# Patient Record
Sex: MALE | Race: WHITE | NOT HISPANIC OR LATINO | Employment: OTHER | URBAN - NONMETROPOLITAN AREA
[De-identification: names, ages, dates, MRNs, and addresses within clinical notes are randomized per-mention and may not be internally consistent; named-entity substitution may affect disease eponyms.]

---

## 2019-09-09 RX ORDER — ALPRAZOLAM 0.5 MG/1
TABLET ORAL
Qty: 30 TABLET | Refills: 0 | OUTPATIENT
Start: 2019-09-09

## 2020-02-04 ENCOUNTER — OFFICE VISIT (OUTPATIENT)
Dept: UROLOGY | Facility: CLINIC | Age: 60
End: 2020-02-04

## 2020-02-04 VITALS
RESPIRATION RATE: 16 BRPM | DIASTOLIC BLOOD PRESSURE: 77 MMHG | SYSTOLIC BLOOD PRESSURE: 126 MMHG | OXYGEN SATURATION: 96 % | TEMPERATURE: 97.5 F | HEART RATE: 75 BPM

## 2020-02-04 DIAGNOSIS — Z80.42 FAMILY HISTORY OF PROSTATE CANCER IN FATHER: ICD-10-CM

## 2020-02-04 DIAGNOSIS — R97.20 ELEVATED PROSTATE SPECIFIC ANTIGEN (PSA): Primary | ICD-10-CM

## 2020-02-04 DIAGNOSIS — E29.1 HYPOGONADISM MALE: ICD-10-CM

## 2020-02-04 LAB
BILIRUB BLD-MCNC: NEGATIVE MG/DL
CLARITY, POC: CLEAR
COLOR UR: YELLOW
GLUCOSE UR STRIP-MCNC: NEGATIVE MG/DL
KETONES UR QL: NEGATIVE
LEUKOCYTE EST, POC: NEGATIVE
NITRITE UR-MCNC: NEGATIVE MG/ML
PH UR: 6.5 [PH] (ref 5–8)
PROT UR STRIP-MCNC: NEGATIVE MG/DL
RBC # UR STRIP: NEGATIVE /UL
SP GR UR: 1.01 (ref 1–1.03)
UROBILINOGEN UR QL: NORMAL

## 2020-02-04 PROCEDURE — 99204 OFFICE O/P NEW MOD 45 MIN: CPT | Performed by: UROLOGY

## 2020-02-04 PROCEDURE — 81003 URINALYSIS AUTO W/O SCOPE: CPT | Performed by: UROLOGY

## 2020-02-04 NOTE — PROGRESS NOTES
Chief Complaint  Elevated PSA (4.10)        CLAIRE Tijerina is a 59 y.o. male who referred for evaluation of his prostate in light of a recently discovered elevated PSA at 4.1.  Actually normal for his age is up to 4.5.  This may be more significant however and that his father has prostate cancer.  Of more importance is the fact that he is currently receiving testosterone on a regular basis for his hypogonadism and of course if he did have prostate cancer this would stimulate it to grow faster.  On the other hand the testosterone could be stimulating the prostate and be accountable for the elevated PSA rather than cancer.    He is actually an old patient of mine although I have not seen him for 20 years.  He had an episode of urinary retention after elective hemorrhoid surgery but returns today voiding without difficulty and describing an AUA index of only 6.    Vitals:    02/04/20 1019   BP: 126/77   Pulse: 75   Resp: 16   Temp: 97.5 °F (36.4 °C)   SpO2: 96%       Past Medical History  Past Medical History:   Diagnosis Date   • Depression    • Kidney infection        Past Surgical History  Past Surgical History:   Procedure Laterality Date   • KNEE SURGERY         Medications  has a current medication list which includes the following prescription(s): escitalopram oxalate.      Allergies  Allergies   Allergen Reactions   • Penicillins Hives   • Sulfa Antibiotics Hives       Social History  Social History     Socioeconomic History   • Marital status:      Spouse name: Not on file   • Number of children: Not on file   • Years of education: Not on file   • Highest education level: Not on file   Tobacco Use   • Smoking status: Former Smoker   • Smokeless tobacco: Never Used   • Tobacco comment: stopped smoking 20+ years ago   Substance and Sexual Activity   • Alcohol use: Yes   • Drug use: Never   • Sexual activity: Defer       Family History  He has no family history of bladder or kidney cancer  He has no family  history of kidney stones      AUA Symptom Score:      Review of Systems  Review of Systems   Constitutional: Negative for activity change, appetite change, chills, fatigue, fever, unexpected weight gain and unexpected weight loss.   Respiratory: Negative for apnea, cough, chest tightness, shortness of breath, wheezing and stridor.    Cardiovascular: Negative for chest pain, palpitations and leg swelling.   Gastrointestinal: Negative for abdominal distention, abdominal pain, anal bleeding, blood in stool, constipation, diarrhea, nausea, rectal pain, vomiting, GERD and indigestion.   Genitourinary: Negative for decreased libido, decreased urine volume, difficulty urinating, discharge, dysuria, flank pain, frequency, genital sores, hematuria, nocturia, penile pain, erectile dysfunction, penile swelling, scrotal swelling, testicular pain, urgency and urinary incontinence.   Musculoskeletal: Negative for back pain and joint swelling.   Neurological: Negative for tremors, seizures, speech difficulty, weakness and numbness.   Psychiatric/Behavioral: Negative for agitation, decreased concentration, sleep disturbance, depressed mood and stress. The patient is not nervous/anxious.      He does describe a discharge from his eyes along with problems with anxiety and depression.  Physical Exam  Physical Exam   Constitutional: He is oriented to person, place, and time. He appears well-developed and well-nourished.   HENT:   Head: Normocephalic and atraumatic.   Neck: Normal range of motion.   Pulmonary/Chest: Effort normal. No respiratory distress.   Abdominal: Soft. He exhibits no distension and no mass. There is no tenderness. No hernia.   Genitourinary: Rectum normal and penis normal. Prostate is enlarged.   Musculoskeletal: Normal range of motion.   Lymphadenopathy:     He has no cervical adenopathy.   Neurological: He is alert and oriented to person, place, and time.   Skin: Skin is warm and dry.   Psychiatric: He has a  normal mood and affect. His behavior is normal.   Vitals reviewed.      Labs Recent and today in the office:  Results for orders placed or performed in visit on 02/04/20   POC Urinalysis Dipstick, Automated   Result Value Ref Range    Color Yellow Yellow, Straw, Dark Yellow, Candace    Clarity, UA Clear Clear    Specific Gravity  1.015 1.005 - 1.030    pH, Urine 6.5 5.0 - 8.0    Leukocytes Negative Negative    Nitrite, UA Negative Negative    Protein, POC Negative Negative mg/dL    Glucose, UA Negative Negative, 1000 mg/dL (3+) mg/dL    Ketones, UA Negative Negative    Urobilinogen, UA Normal Normal    Bilirubin Negative Negative    Blood, UA Negative Negative         Assessment & Plan  Elevated PSA/family history of prostate cancer: The patient's risk of prostate cancer at this point is fairly low but he is informed very close surveillance will be required and if his PSA continues to progress he will need a prostate biopsy to prove he does not have cancer before safely continuing his testosterone supplement.    With his family history of prostate cancer and now elevated PSA he is strongly encouraged to eat a plant-based heart healthy diet to reduce risk of getting prostate cancer.    Hypogonadism: He has achieved a significant benefit from the testosterone supplement in terms of increase strength stamina attitude and libido and is anxious to continue the testosterone if at all possible.

## 2020-02-13 ENCOUNTER — OFFICE VISIT (OUTPATIENT)
Dept: PSYCHIATRY | Facility: CLINIC | Age: 60
End: 2020-02-13

## 2020-02-13 VITALS
HEART RATE: 92 BPM | BODY MASS INDEX: 31.68 KG/M2 | SYSTOLIC BLOOD PRESSURE: 142 MMHG | DIASTOLIC BLOOD PRESSURE: 80 MMHG | HEIGHT: 73 IN | WEIGHT: 239 LBS

## 2020-02-13 DIAGNOSIS — F41.0 PANIC DISORDER: ICD-10-CM

## 2020-02-13 DIAGNOSIS — F33.1 MODERATE EPISODE OF RECURRENT MAJOR DEPRESSIVE DISORDER (HCC): Primary | ICD-10-CM

## 2020-02-13 DIAGNOSIS — F41.9 ANXIETY DISORDER, UNSPECIFIED TYPE: ICD-10-CM

## 2020-02-13 PROCEDURE — 90792 PSYCH DIAG EVAL W/MED SRVCS: CPT | Performed by: NURSE PRACTITIONER

## 2020-02-13 RX ORDER — ESCITALOPRAM OXALATE 20 MG/1
20 TABLET ORAL DAILY
Qty: 30 TABLET | Refills: 0 | Status: SHIPPED | OUTPATIENT
Start: 2020-02-13 | End: 2020-02-13

## 2020-02-13 RX ORDER — ALPRAZOLAM 0.5 MG/1
0.5 TABLET ORAL NIGHTLY PRN
Qty: 30 TABLET | Refills: 0 | Status: SHIPPED | OUTPATIENT
Start: 2020-02-13 | End: 2020-08-11 | Stop reason: SDUPTHER

## 2020-02-13 NOTE — PROGRESS NOTES
Brady Tijerina is a 59 y.o. male who is here today for initial appointment.     Chief Complaint:     ICD-10-CM ICD-9-CM   1. Moderate episode of recurrent major depressive disorder (CMS/Formerly [facility]) F33.1 296.32   2. Anxiety disorder, unspecified type F41.9 300.00   3. Panic disorder F41.0 300.01       HPI: Pt presents for initial visit for medication management of depressive and anxiety symptoms. Pt is transferring care for [facility] Pt is currently taking Lexapro 5 mg daily. Pt also takes Xanax 0.5 mg prn for panic episodes. Continues to report depressive symptoms. States he has been unable in the past to increase the dose to 10 mg due to the reappearance of sexual side effects and weight gain with higher dosing. Pt reports the following psychiatric medication history: Bupropion XL, Paroxetine, Venlafaxine XR, Sertraline, Buspirone,Mirtazepine, Ativan and Quetiapine.    Pt reports the presence/absence of the following depressive symptoms: (+) depressed mood, (-) reduced appetite, (-) increased appetite, (-) poor concentration, (-) hopelessness, (-) worthlessness, (-) insomnia, (-) hypersomnia, (-) psychomotor agitation, (-) irritability, (-) anhedonia, (+) amotivation, (-) fatigue, (-) suicidal ideation, (-) suicidal plan, (-) suicidal intent, (-) recurrent thoughts about death, and (-) homicidal ideation.    Pt reports the presence/absence of the following anxiety symptoms: (-) excessive worry, (-) excessive fear, (+) difficulty relaxing, (-) restlessness, (-) insomnia, (-) easily fatigued, (-) irritability, (-) poor concentration, (+) racing thoughts, and (+) panic episodes.     Past Medical History:   Past Medical History:   Diagnosis Date   • Anxiety    • Depression    • Kidney infection        Past Surgical History:   Past Surgical History:   Procedure Laterality Date   • KNEE SURGERY         Social History:   Social History     Socioeconomic History   • Marital status:      Spouse name: Not  "on file   • Number of children: 3   • Years of education: Not on file   • Highest education level: High school graduate   Tobacco Use   • Smoking status: Former Smoker   • Smokeless tobacco: Never Used   • Tobacco comment: stopped smoking 20+ years ago   Substance and Sexual Activity   • Alcohol use: Yes     Comment: \"on occasion\"   • Drug use: Never   • Sexual activity: Defer       Allergy:  Allergies   Allergen Reactions   • Penicillins Hives   • Sulfa Antibiotics Hives       Current Medications:   Current Outpatient Medications   Medication Sig Dispense Refill   • TESTOSTERONE COMPOUNDING KIT 20 % cream Place  on the skin as directed by provider.     • ALPRAZolam (XANAX) 0.5 MG tablet Take 1 tablet by mouth At Night As Needed for Anxiety. 30 tablet 0   • Vortioxetine HBr (TRINTELLIX) 5 MG tablet Take 5 mg by mouth Daily With Breakfast. 30 tablet 0     No current facility-administered medications for this visit.        Lab Results:       Review of Symptoms:   Review of Systems   Constitutional: Negative.  Negative for activity change, appetite change, unexpected weight gain and unexpected weight loss.   Respiratory: Negative.    Cardiovascular: Negative.  Negative for chest pain.   Gastrointestinal: Negative.  Negative for diarrhea, nausea and vomiting.   Genitourinary: Negative.    Musculoskeletal: Negative.    Skin: Negative for rash and bruise.   Neurological: Negative.  Negative for dizziness, seizures and speech difficulty.   Psychiatric/Behavioral: Positive for dysphoric mood. Negative for agitation, behavioral problems, decreased concentration, hallucinations, self-injury, sleep disturbance, suicidal ideas, negative for hyperactivity, depressed mood and stress. The patient is not nervous/anxious.        Physical Exam:   Physical Exam  Blood pressure 142/80, pulse 92, height 185.4 cm (73\"), weight 108 kg (239 lb).    Mental Status Exam:   Appearance: appropriate  Hygiene:   good  Cooperation:  " Cooperative  Eye Contact:  Good  Psychomotor Behavior:  Appropriate  Mood:euthymic  Affect:  Appropriate  Hopelessness: Denies  Speech:  Normal  Thought Process:  Goal directed  Thought Content:  Normal  Suicidal:  None  Homicidal:  None  Hallucinations:  None  Delusion:  None  Memory:  Intact  Orientation:  Person, Place, Time and Situation  Reliability:  good  Insight:  Good  Judgement:  Good  Impulse Control:  Good  Physical/Medical Issues:  No         Assessment/Plan   Diagnoses and all orders for this visit:    Moderate episode of recurrent major depressive disorder (CMS/HCC)  -     Vortioxetine HBr (TRINTELLIX) 5 MG tablet; Take 5 mg by mouth Daily With Breakfast.    Anxiety disorder, unspecified type    Panic disorder  -     ALPRAZolam (XANAX) 0.5 MG tablet; Take 1 tablet by mouth At Night As Needed for Anxiety.    Other orders  -     Discontinue: escitalopram (LEXAPRO) 20 MG tablet; Take 1 tablet by mouth Daily.    SILVESTRE reviewed-last Xanax script prescribed in 2/2019  Sign Controlled Substance Agreement  Sign medical release for UDS results from [facility] in [place]      Treatment Plan        Problem: Anxiety/Depression      Intervention: The prescription and adjustment of medications and monitoring of side effects. Add Trintellix. Discontinue Lexapro. Ativan prn panic      Long term Goal: Overall improvement in mood and functioning per patient self -report      Short term Goal: Medication adherence. Improvement in symptoms per patient self-report.       A psychological evaluation was conducted in order to assess past and current level of functioning. Areas assessed included, but were not limited to: perception of social support, perception of ability to face and deal with challenges in life (positive functioning), anxiety symptoms, depressive symptoms, perspective on beliefs/belief system, coping skills for stress, intelligence level,  Therapeutic rapport was established. Interventions conducted today were  geared towards incorporating medication management along with support for continued therapy. Education was also provided as to the med management with this provider and what to expect in subsequent sessions.    We discussed risks, benefits,goals and side effects of the above medication and the patient was agreeable with the plan.Patient was educated on the importance of compliance with treatment and follow-up appointments. Patient is aware to contact the [facility] with any worsening of symptoms. To call for questions or concerns and return early if necessary. Patent is agreeable to go to the Emergency Department or call 911 should they begin SI/HI.     Return in about 6 months (around 8/13/2020) for Follow Up.    Bonnie Meza, DNP, APRN, PMHNP-BC, FNP-C

## 2020-05-01 ENCOUNTER — RESULTS ENCOUNTER (OUTPATIENT)
Dept: UROLOGY | Facility: CLINIC | Age: 60
End: 2020-05-01

## 2020-05-01 DIAGNOSIS — Z80.42 FAMILY HISTORY OF PROSTATE CANCER IN FATHER: ICD-10-CM

## 2020-05-01 DIAGNOSIS — R97.20 ELEVATED PROSTATE SPECIFIC ANTIGEN (PSA): ICD-10-CM

## 2020-08-03 ENCOUNTER — TELEPHONE (OUTPATIENT)
Dept: PSYCHIATRY | Facility: CLINIC | Age: 60
End: 2020-08-03

## 2020-08-03 RX ORDER — ESCITALOPRAM OXALATE 10 MG/1
10 TABLET ORAL DAILY
Qty: 30 TABLET | Refills: 2 | Status: SHIPPED | OUTPATIENT
Start: 2020-08-03 | End: 2020-10-07 | Stop reason: SDUPTHER

## 2020-08-03 NOTE — TELEPHONE ENCOUNTER
Cora, he tried the trintellix. He did not like the way it worked for him. I looked back in the note with Bonnie while talking with him, and he was taking lexapro 20 mg and he states it works well for him.

## 2020-08-03 NOTE — TELEPHONE ENCOUNTER
Patient requesting lexapro, is not taking other meds prescribed by Bonnie. lexapro 20 mg. [facility].

## 2020-08-03 NOTE — TELEPHONE ENCOUNTER
Please clarify that it is Lexapro and not Trintellix, as I only see Trintellix ordered. Thanks! Cora

## 2020-08-03 NOTE — TELEPHONE ENCOUNTER
So, I will start with the Lexapro 5 mg and he can let us know how he thinks it is working in about 30 days. Thanks. Cora

## 2020-08-11 DIAGNOSIS — F41.0 PANIC DISORDER: ICD-10-CM

## 2020-08-11 RX ORDER — ALPRAZOLAM 0.5 MG/1
0.5 TABLET ORAL NIGHTLY PRN
Qty: 30 TABLET | Refills: 0 | Status: SHIPPED | OUTPATIENT
Start: 2020-08-11 | End: 2020-12-11 | Stop reason: SDUPTHER

## 2020-10-07 ENCOUNTER — OFFICE VISIT (OUTPATIENT)
Dept: PSYCHIATRY | Facility: CLINIC | Age: 60
End: 2020-10-07

## 2020-10-07 VITALS
WEIGHT: 242 LBS | TEMPERATURE: 96.9 F | DIASTOLIC BLOOD PRESSURE: 90 MMHG | SYSTOLIC BLOOD PRESSURE: 168 MMHG | HEART RATE: 74 BPM | HEIGHT: 73 IN | BODY MASS INDEX: 32.07 KG/M2 | RESPIRATION RATE: 18 BRPM

## 2020-10-07 DIAGNOSIS — F32.0 CURRENT MILD EPISODE OF MAJOR DEPRESSIVE DISORDER, UNSPECIFIED WHETHER RECURRENT (HCC): Primary | ICD-10-CM

## 2020-10-07 DIAGNOSIS — F41.9 ANXIETY DISORDER, UNSPECIFIED TYPE: ICD-10-CM

## 2020-10-07 PROCEDURE — 99214 OFFICE O/P EST MOD 30 MIN: CPT | Performed by: NURSE PRACTITIONER

## 2020-10-07 RX ORDER — ESCITALOPRAM OXALATE 10 MG/1
10 TABLET ORAL DAILY
Qty: 30 TABLET | Refills: 2 | Status: SHIPPED | OUTPATIENT
Start: 2020-10-07 | End: 2021-01-04

## 2020-10-07 NOTE — PROGRESS NOTES
"Subjective   Brady Tijerina is a 60 y.o. male who presents today for follow up    Chief Complaint:  depression and anxiety    History of Present Illness:  Brady is a 60-year-old  male who presents by himself for an initial evaluation by this provider as he was seen in the clinic by a previous provider.  Brady states \"the medicine helps the most part.  It takes the edge off.\"  He states that he also sees a homeopathic physician and has researched other methods for his anxiety.  Brady tells me \"Xanax is the most effective for anxiety.  But I really take it may be if I have a restless night or when I am going to the dentist.\"  Brady and I had a lengthy discussion about concerns of long-term benzodiazepine use.  I explained to Brady that over time it may cause cognitive impairment or dependency.  Brady verbalized understanding.  Brady's current medication regiment is Xanax and Lexapro.  He denies any side effects of his current medication management.  He denies any symptoms of depression at this time.  He states his anxious symptoms are restlessness, worry, increased heart rate, and decreased concentration.  He denies any problems with sleep or appetite.  He denies any SI/HI/AVH.    Brady lives in [place], [place] by himself.  He has been  for 15 years.  He has 3 biological children, 2 of which he is estranged from.  Brady is currently in a relationship with a woman and states that things are going well.  He denies any tobacco/illicit drug use.  He states he may occasionally drink alcohol.    The following portions of the patient's history were reviewed and updated as appropriate: allergies, current medications, past family history, past medical history, past social history, past surgical history and problem list.    Past Medical History:  Past Medical History:   Diagnosis Date   • Anxiety    • Depression    • Kidney infection        Social History:  Social History     Socioeconomic History   • Marital " "status:      Spouse name: Not on file   • Number of children: 3   • Years of education: Not on file   • Highest education level: High school graduate   Tobacco Use   • Smoking status: Former Smoker   • Smokeless tobacco: Never Used   • Tobacco comment: stopped smoking 20+ years ago   Substance and Sexual Activity   • Alcohol use: Yes     Comment: \"on occasion\"   • Drug use: Never   • Sexual activity: Defer       Family History:  Family History   Problem Relation Age of Onset   • Cancer Father    • Anxiety disorder Father    • Cancer Mother    • Suicide Attempts Paternal Grandmother    • Depression Paternal Grandmother    • Alcohol abuse Paternal Grandmother    • ADD / ADHD Sister    • Alcohol abuse Sister    • Drug abuse Sister    • Anxiety disorder Sister    • OCD Sister    • Depression Sister    • Paranoid behavior Sister    • Alcohol abuse Brother    • Depression Brother    • Alcohol abuse Maternal Aunt    • Alcohol abuse Paternal Aunt    • Alcohol abuse Maternal Uncle    • Alcohol abuse Paternal Uncle    • ADD / ADHD Cousin    • Bipolar disorder Neg Hx    • Dementia Neg Hx    • Schizophrenia Neg Hx    • Seizures Neg Hx    • Self-Injurious Behavior  Neg Hx        Past Surgical History:  Past Surgical History:   Procedure Laterality Date   • KNEE SURGERY         Problem List:  There is no problem list on file for this patient.      Allergy:   Allergies   Allergen Reactions   • Penicillins Hives   • Sulfa Antibiotics Hives        Current Medications:   Current Outpatient Medications   Medication Sig Dispense Refill   • ALPRAZolam (XANAX) 0.5 MG tablet Take 1 tablet by mouth At Night As Needed for Anxiety. 30 tablet 0   • escitalopram (Lexapro) 10 MG tablet Take 1 tablet by mouth Daily. 30 tablet 2   • TESTOSTERONE COMPOUNDING KIT 20 % cream Place  on the skin as directed by provider.       No current facility-administered medications for this visit.        Review of Symptoms:    Review of Systems " "  Constitutional: Negative for chills, fever, unexpected weight gain and unexpected weight loss.   HENT: Negative.    Eyes: Negative.    Respiratory: Negative for cough and shortness of breath.    Cardiovascular: Negative for chest pain and palpitations.   Gastrointestinal: Negative for abdominal pain, constipation, diarrhea, vomiting and indigestion.   Musculoskeletal: Negative for arthralgias, gait problem and joint swelling.   Skin: Negative.    Allergic/Immunologic: Negative.    Neurological: Negative for dizziness, speech difficulty, weakness, memory problem and confusion.   Psychiatric/Behavioral: Positive for depressed mood (improved). Negative for behavioral problems, decreased concentration, sleep disturbance and suicidal ideas. The patient is nervous/anxious.      Physical Exam:   Blood pressure 168/90, pulse 74, temperature 96.9 °F (36.1 °C), temperature source Infrared, resp. rate 18, height 185.4 cm (73\"), weight 110 kg (242 lb). Body mass index is 31.93 kg/m².     Physical Exam  Vitals signs and nursing note reviewed.   Constitutional:       Appearance: He is well-developed.   Musculoskeletal: Normal range of motion.   Skin:     General: Skin is warm and dry.   Neurological:      Mental Status: He is alert and oriented to person, place, and time.   Psychiatric:         Attention and Perception: He does not perceive auditory or visual hallucinations.         Mood and Affect: Mood is not anxious or depressed.         Behavior: Behavior is not hyperactive.         Thought Content: Thought content is not paranoid.         Cognition and Memory: Cognition is not impaired. Memory is not impaired.      Comments: Improving depression and anxiety          Appearance: Developed, well-nourished, appears stated age, and NAD  Gait, Station, Strength: WNL    Patient's Support Network Includes:  son    Functional Status: Mild impairment     Progress toward goal: Not at goal    Prognosis: Good with Ongoing Treatment "     Mental Status Exam:   Hygiene:   good  Cooperation:  Cooperative  Eye Contact:  Good  Psychomotor Behavior:  Appropriate  Affect:  Full range  Mood: euthymic  Hopelessness: Denies  Speech:  Normal  Thought Process:  Goal directed and Linear  Thought Content:  Normal  Suicidal:  None  Homicidal:  None  Hallucinations:  None  Delusion:  None  Memory:  Intact  Orientation:  Person, Place, Time and Situation  Reliability:  good  Insight:  Good  Judgement:  Good  Impulse Control:  Good  Physical/Medical Issues:  No      Lab Results:   No visits with results within 1 Month(s) from this visit.   Latest known visit with results is:   Office Visit on 02/04/2020   Component Date Value Ref Range Status   • Color 02/04/2020 Yellow  Yellow, Straw, Dark Yellow, Candace Final   • Clarity, UA 02/04/2020 Clear  Clear Final   • Specific Gravity  02/04/2020 1.015  1.005 - 1.030 Final   • pH, Urine 02/04/2020 6.5  5.0 - 8.0 Final   • Leukocytes 02/04/2020 Negative  Negative Final   • Nitrite, UA 02/04/2020 Negative  Negative Final   • Protein, POC 02/04/2020 Negative  Negative mg/dL Final   • Glucose, UA 02/04/2020 Negative  Negative, 1000 mg/dL (3+) mg/dL Final   • Ketones, UA 02/04/2020 Negative  Negative Final   • Urobilinogen, UA 02/04/2020 Normal  Normal Final   • Bilirubin 02/04/2020 Negative  Negative Final   • Blood, UA 02/04/2020 Negative  Negative Final       Assessment/Plan   Diagnoses and all orders for this visit:    Current mild episode of major depressive disorder, unspecified whether recurrent (CMS/HCC)  -     escitalopram (Lexapro) 10 MG tablet; Take 1 tablet by mouth Daily.    Anxiety disorder, unspecified type        Visit Diagnoses:    ICD-10-CM ICD-9-CM   1. Current mild episode of major depressive disorder, unspecified whether recurrent (CMS/HCC)  F32.0 296.21   2. Anxiety disorder, unspecified type  F41.9 300.00       Review:   I have reviewed the patient's previous medical records to include labs, radiology,  notes and medications.     Impression:   -This is my initial evaluation of the patient.  Brady is endorsing improved symptoms of depression and anxiety.  He is happy with his current medication regiment.  -Continue Lexapro 10 mg daily for depression.  -Continue Xanax 0.5 mg daily as needed for anxiety.  Patient has refills.  -The SILVESTRE report, reviewed through [facility], of the past 12 months were reviewed and is appropriate.  The patient/guardian reports taking the medication only as prescribed.  The patient/guardian denies any abuse or misuse of the medication.  The patient/guardian denies any other substance use or issues.  There are no apparent substance related issues.  The patient reports no side effects of the current medication usage.  The patient/guardian has reported significant improvement with medication usage and wishes to continue medication as prescribed.  The patient/guardian is appropriate to continue with current medication usage at this time.  Reinforced risks and side effects of medication usage, patient and/or guardian verbalize understanding in their own words and are in agreement with current plan.    TREATMENT PLAN/GOALS: Continue supportive psychotherapy efforts and medications as indicated. Treatment and medication options discussed during today's visit. Patient ackowledged and verbally consented to continue with current treatment plan and was educated on the importance of compliance with treatment and follow-up appointments.    MEDICATION ISSUES:    We discussed risks, benefits, and side effects of the above medications and the patient was agreeable with the plan. Patient was educated on the importance of compliance with treatment and follow-up appointments.  Patient is agreeable to call the office with any worsening of symptoms or onset of side effects. Patient is agreeable to call 911 or go to the nearest ER should he/she begin having SI/HI.      Counseled patient regarding multimodal  approach with healthy nutrition, healthy sleep, regular physical activity, social activities, counseling, and medications.      Coping skills reviewed and encouraged positive framing of thoughts     Assisted patient in processing above session content; acknowledged and normalized patient’s thoughts, feelings, and concerns.  Applied  positive coping skills and behavior management in session.  Allowed patient to freely discuss issues without interruption or judgment. Provided safe, confidential environment to facilitate the development of positive therapeutic relationship and encourage open, honest communication. Assisted patient in identifying risk factors which would indicate the need for higher level of care including thoughts to harm self or others and/or self-harming behavior and encouraged patient to contact this office, call 911, or present to the nearest emergency room should any of these events occur. Discussed crisis intervention services and means to access.     MEDS ORDERED DURING VISIT:  New Medications Ordered This Visit   Medications   • escitalopram (Lexapro) 10 MG tablet     Sig: Take 1 tablet by mouth Daily.     Dispense:  30 tablet     Refill:  2       Return in about 3 months (around 1/7/2021) for Medication Check.             This document has been electronically signed by TOBI Rdz  October 7, 2020 09:32 EDT    Please note that portions of this note were completed with a voice recognition program. Efforts were made to edit dictation, but occasionally words are mistranscribed.

## 2020-12-11 DIAGNOSIS — F41.0 PANIC DISORDER: ICD-10-CM

## 2020-12-11 RX ORDER — ALPRAZOLAM 0.5 MG/1
0.5 TABLET ORAL NIGHTLY PRN
Qty: 30 TABLET | Refills: 0 | Status: SHIPPED | OUTPATIENT
Start: 2020-12-11 | End: 2021-03-02 | Stop reason: SDUPTHER

## 2021-01-04 ENCOUNTER — OFFICE VISIT (OUTPATIENT)
Dept: PSYCHIATRY | Facility: CLINIC | Age: 61
End: 2021-01-04

## 2021-01-04 VITALS — WEIGHT: 230 LBS | BODY MASS INDEX: 30.48 KG/M2 | HEIGHT: 73 IN

## 2021-01-04 DIAGNOSIS — F41.0 PANIC DISORDER: ICD-10-CM

## 2021-01-04 DIAGNOSIS — F41.9 ANXIETY DISORDER, UNSPECIFIED TYPE: ICD-10-CM

## 2021-01-04 DIAGNOSIS — F32.0 CURRENT MILD EPISODE OF MAJOR DEPRESSIVE DISORDER, UNSPECIFIED WHETHER RECURRENT (HCC): Primary | ICD-10-CM

## 2021-01-04 PROCEDURE — 99214 OFFICE O/P EST MOD 30 MIN: CPT | Performed by: NURSE PRACTITIONER

## 2021-01-04 RX ORDER — ESCITALOPRAM OXALATE 10 MG/1
10 TABLET ORAL DAILY
Qty: 30 TABLET | Refills: 2 | Status: SHIPPED | OUTPATIENT
Start: 2021-01-04 | End: 2021-03-02

## 2021-01-04 RX ORDER — CHOLECALCIFEROL (VITAMIN D3) 12.5 MCG/5
25 LIQUID (ML) ORAL DAILY
COMMUNITY
End: 2022-06-02

## 2021-01-04 RX ORDER — BUPROPION HYDROCHLORIDE 150 MG/1
150 TABLET ORAL DAILY
Qty: 30 TABLET | Refills: 0 | Status: SHIPPED | OUTPATIENT
Start: 2021-01-04 | End: 2021-03-02

## 2021-01-04 NOTE — PROGRESS NOTES
"Subjective   Brady Tijerina is a 60 y.o. male who presents today for follow up    Chief Complaint:  Depression and anxiety    History of Present Illness: Brady is a 60-year-old  male who presents for a follow-up and medication check.  Brady states \"I have been pretty good.  I am about the same.\"  Brady tells me that he continues to experience depressive symptoms.  He endorses depressive symptoms such as depressed mood and rates his depression a two out of 10 most days over the last two weeks with 10 being the most severe.  Brady also continues to endorse anxious symptoms.  He rates his anxiety 5 out of 10 over the last several weeks with 10 being the most severe.  Brady denies any episodes of panic.  He continues to take his Lexapro but experiences decreased libido.  We had a lengthy discussion about making medication adjustments but Brady expresses concerns about sexual side effects.  We discussed adding Wellbutrin to his current medication regiment.  Brady takes Xanax and Lexapro daily.  He denies any side effects of his current medication regiment.  He denies any problems with sleep or appetite.  He denies any SI/HI/AVH.    The following portions of the patient's history were reviewed and updated as appropriate: allergies, current medications, past family history, past medical history, past social history, past surgical history and problem list.    Past Medical History:  Past Medical History:   Diagnosis Date   • Anxiety    • Depression    • Kidney infection        Social History:  Social History     Socioeconomic History   • Marital status:      Spouse name: Not on file   • Number of children: 3   • Years of education: Not on file   • Highest education level: High school graduate   Tobacco Use   • Smoking status: Former Smoker   • Smokeless tobacco: Never Used   • Tobacco comment: stopped smoking 20+ years ago   Substance and Sexual Activity   • Alcohol use: Yes     Comment: \"on occasion\"   • " Drug use: Never   • Sexual activity: Defer       Family History:  Family History   Problem Relation Age of Onset   • Cancer Father    • Anxiety disorder Father    • Cancer Mother    • Suicide Attempts Paternal Grandmother    • Depression Paternal Grandmother    • Alcohol abuse Paternal Grandmother    • ADD / ADHD Sister    • Alcohol abuse Sister    • Drug abuse Sister    • Anxiety disorder Sister    • OCD Sister    • Depression Sister    • Paranoid behavior Sister    • Alcohol abuse Brother    • Depression Brother    • Alcohol abuse Maternal Aunt    • Alcohol abuse Paternal Aunt    • Alcohol abuse Maternal Uncle    • Alcohol abuse Paternal Uncle    • ADD / ADHD Cousin    • Bipolar disorder Neg Hx    • Dementia Neg Hx    • Schizophrenia Neg Hx    • Seizures Neg Hx    • Self-Injurious Behavior  Neg Hx        Past Surgical History:  Past Surgical History:   Procedure Laterality Date   • KNEE SURGERY         Problem List:  There is no problem list on file for this patient.      Allergy:   Allergies   Allergen Reactions   • Penicillins Hives   • Sulfa Antibiotics Hives        Current Medications:   Current Outpatient Medications   Medication Sig Dispense Refill   • ALPRAZolam (XANAX) 0.5 MG tablet Take 1 tablet by mouth At Night As Needed for Anxiety. 30 tablet 0   • Cholecalciferol (Vitamin D3 Immune Health) 25 MCG/10ML liquid Take 25 mcg by mouth Daily.     • escitalopram (Lexapro) 10 MG tablet Take 1 tablet by mouth Daily. 30 tablet 2   • TESTOSTERONE COMPOUNDING KIT 20 % cream Place  on the skin as directed by provider.     • buPROPion XL (Wellbutrin XL) 150 MG 24 hr tablet Take 1 tablet by mouth Daily. 30 tablet 0     No current facility-administered medications for this visit.        Review of Symptoms:    Review of Systems   Constitutional: Negative for chills, fever, unexpected weight gain and unexpected weight loss.   HENT: Negative.    Eyes: Negative.    Respiratory: Negative for cough and shortness of breath.   "  Cardiovascular: Negative for chest pain and palpitations.   Gastrointestinal: Negative for abdominal pain, constipation, diarrhea, vomiting and indigestion.   Genitourinary: Positive for decreased libido.   Musculoskeletal: Negative for arthralgias, gait problem and joint swelling.   Skin: Negative.    Allergic/Immunologic: Negative.    Neurological: Negative for dizziness, speech difficulty, weakness, memory problem and confusion.   Psychiatric/Behavioral: Positive for depressed mood. Negative for behavioral problems, decreased concentration, sleep disturbance and suicidal ideas. The patient is nervous/anxious.        PHQ-9 Score:   PHQ-9 Total Score: 1     Physical Exam:   Height 185.4 cm (73\"), weight 104 kg (230 lb). Body mass index is 30.34 kg/m².     Physical Exam  Vitals signs and nursing note reviewed.   Constitutional:       Appearance: He is well-developed.   Musculoskeletal: Normal range of motion.   Skin:     General: Skin is warm and dry.   Neurological:      Mental Status: He is alert and oriented to person, place, and time.   Psychiatric:         Attention and Perception: Attention normal.         Mood and Affect: Mood normal.         Speech: Speech normal.         Behavior: Behavior normal. Behavior is cooperative.         Thought Content: Thought content normal.         Cognition and Memory: Cognition normal.         Judgment: Judgment normal.      Comments: Depression and anxiety          Appearance: Well-developed, well-nourished, appears stated age, and NAD  Gait, Station, Strength: WNL    Patient's Support Network Includes:  significant other    Functional Status: Mild impairment     Progress toward goal: Not at goal    Prognosis: Fair with Ongoing Treatment     Mental Status Exam:   Hygiene:   good  Cooperation:  Cooperative  Eye Contact:  Good  Psychomotor Behavior:  Appropriate  Affect:  Full range  Mood: normal  Hopelessness: Denies  Speech:  Normal  Thought Process:  Goal directed and " Linear  Thought Content:  Normal  Suicidal:  None  Homicidal:  None  Hallucinations:  None  Delusion:  None  Memory:  Intact  Orientation:  Person, Place, Time and Situation  Reliability:  good  Insight:  Fair  Judgement:  Good  Impulse Control:  Good  Physical/Medical Issues:  No      Lab Results:   No visits with results within 1 Month(s) from this visit.   Latest known visit with results is:   Office Visit on 02/04/2020   Component Date Value Ref Range Status   • Color 02/04/2020 Yellow  Yellow, Straw, Dark Yellow, Candace Final   • Clarity, UA 02/04/2020 Clear  Clear Final   • Specific Gravity  02/04/2020 1.015  1.005 - 1.030 Final   • pH, Urine 02/04/2020 6.5  5.0 - 8.0 Final   • Leukocytes 02/04/2020 Negative  Negative Final   • Nitrite, UA 02/04/2020 Negative  Negative Final   • Protein, POC 02/04/2020 Negative  Negative mg/dL Final   • Glucose, UA 02/04/2020 Negative  Negative, 1000 mg/dL (3+) mg/dL Final   • Ketones, UA 02/04/2020 Negative  Negative Final   • Urobilinogen, UA 02/04/2020 Normal  Normal Final   • Bilirubin 02/04/2020 Negative  Negative Final   • Blood, UA 02/04/2020 Negative  Negative Final       Assessment/Plan   Diagnoses and all orders for this visit:    1. Current mild episode of major depressive disorder, unspecified whether recurrent (CMS/HCC) (Primary)  -     escitalopram (Lexapro) 10 MG tablet; Take 1 tablet by mouth Daily.  Dispense: 30 tablet; Refill: 2  -     buPROPion XL (Wellbutrin XL) 150 MG 24 hr tablet; Take 1 tablet by mouth Daily.  Dispense: 30 tablet; Refill: 0    2. Anxiety disorder, unspecified type    3. Panic disorder        Visit Diagnoses:    ICD-10-CM ICD-9-CM   1. Current mild episode of major depressive disorder, unspecified whether recurrent (CMS/HCC)  F32.0 296.21   2. Anxiety disorder, unspecified type  F41.9 300.00   3. Panic disorder  F41.0 300.01       Review:   I have reviewed the patient's previous medical records to include labs, radiology, notes and  medications.      Impression:   -This is a follow-up and medication check.  Brady continues to endorse symptoms of depression and anxiety.  He tells me that his panic has been stable without any recurrences since October.  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  Brady is interested in making medication adjustments today; however, he has been known to have sexual side effects of his antidepressant therapy.  We had discussions about adding Wellbutrin to his medication regiment.  -Initiate Wellbutrin  mg daily for depression.  I explained the purpose of this medication to Brady.  We discussed the risk versus benefits of adding this medication to his regiment, as well as potential side effects.  He verbalized understanding.  -Continue Lexapro 10 mg daily for depression and anxiety.  I provided refills for this medication during his visit today.  -Continue Xanax 0.5 mg as needed nightly for panic.  -The SILVESTRE report, reviewed through [facility], of the past 12 months were reviewed and is appropriate.  The patient/guardian reports taking the medication only as prescribed.  The patient/guardian denies any abuse or misuse of the medication.  The patient/guardian denies any other substance use or issues.  There are no apparent substance related issues.  The patient reports no side effects of the current medication usage.  The patient/guardian has reported significant improvement with medication usage and wishes to continue medication as prescribed.  The patient/guardian is appropriate to continue with current medication usage at this time.  Reinforced risks and side effects of medication usage, patient and/or guardian verbalize understanding in their own words and are in agreement with current plan.      TREATMENT PLAN/GOALS: Continue supportive psychotherapy efforts and medications as indicated. Treatment and medication options discussed during today's  visit. Patient ackowledged and verbally consented to continue with current treatment plan and was educated on the importance of compliance with treatment and follow-up appointments.    MEDICATION ISSUES:    We discussed risks, benefits, and side effects of the above medications and the patient was agreeable with the plan. Patient was educated on the importance of compliance with treatment and follow-up appointments.  Patient is agreeable to call the office with any worsening of symptoms or onset of side effects. Patient is agreeable to call 911 or go to the nearest ER should he/she begin having SI/HI.      Counseled patient regarding multimodal approach with healthy nutrition, healthy sleep, regular physical activity, social activities, counseling, and medications.      Coping skills reviewed and encouraged positive framing of thoughts     Assisted patient in processing above session content; acknowledged and normalized patient’s thoughts, feelings, and concerns.  Applied  positive coping skills and behavior management in session.  Allowed patient to freely discuss issues without interruption or judgment. Provided safe, confidential environment to facilitate the development of positive therapeutic relationship and encourage open, honest communication. Assisted patient in identifying risk factors which would indicate the need for higher level of care including thoughts to harm self or others and/or self-harming behavior and encouraged patient to contact this office, call 911, or present to the nearest emergency room should any of these events occur. Discussed crisis intervention services and means to access.     MEDS ORDERED DURING VISIT:  New Medications Ordered This Visit   Medications   • escitalopram (Lexapro) 10 MG tablet     Sig: Take 1 tablet by mouth Daily.     Dispense:  30 tablet     Refill:  2   • buPROPion XL (Wellbutrin XL) 150 MG 24 hr tablet     Sig: Take 1 tablet by mouth Daily.     Dispense:  30  tablet     Refill:  0       Return in about 2 months (around 3/4/2021) for Medication Check.             This document has been electronically signed by TOBI Rdz  January 4, 2021 09:49 EST    Please note that portions of this note were completed with a voice recognition program. Efforts were made to edit dictation, but occasionally words are mistranscribed.

## 2021-03-02 ENCOUNTER — OFFICE VISIT (OUTPATIENT)
Dept: PSYCHIATRY | Facility: CLINIC | Age: 61
End: 2021-03-02

## 2021-03-02 VITALS
RESPIRATION RATE: 18 BRPM | WEIGHT: 244 LBS | TEMPERATURE: 97.1 F | BODY MASS INDEX: 32.34 KG/M2 | DIASTOLIC BLOOD PRESSURE: 78 MMHG | HEART RATE: 71 BPM | HEIGHT: 73 IN | SYSTOLIC BLOOD PRESSURE: 160 MMHG

## 2021-03-02 DIAGNOSIS — Z79.899 MEDICATION MANAGEMENT: ICD-10-CM

## 2021-03-02 DIAGNOSIS — F41.9 ANXIETY DISORDER, UNSPECIFIED TYPE: Chronic | ICD-10-CM

## 2021-03-02 DIAGNOSIS — F41.0 PANIC DISORDER: Chronic | ICD-10-CM

## 2021-03-02 DIAGNOSIS — F32.0 CURRENT MILD EPISODE OF MAJOR DEPRESSIVE DISORDER, UNSPECIFIED WHETHER RECURRENT (HCC): Primary | Chronic | ICD-10-CM

## 2021-03-02 PROCEDURE — 99214 OFFICE O/P EST MOD 30 MIN: CPT | Performed by: NURSE PRACTITIONER

## 2021-03-02 RX ORDER — ESCITALOPRAM OXALATE 10 MG/1
10 TABLET ORAL DAILY
Qty: 30 TABLET | Refills: 2 | Status: SHIPPED | OUTPATIENT
Start: 2021-03-02 | End: 2021-06-02 | Stop reason: SDUPTHER

## 2021-03-02 RX ORDER — ALPRAZOLAM 0.5 MG/1
0.5 TABLET ORAL NIGHTLY PRN
Qty: 30 TABLET | Refills: 0 | Status: SHIPPED | OUTPATIENT
Start: 2021-03-02 | End: 2021-08-10

## 2021-03-02 NOTE — PROGRESS NOTES
"Chief Complaint  Depression, anxiety, and panic      Subjective          Brady Tijerina presents to [facility] by himself for a follow up and medication check.    History of Present Illness: Brady states, \"I am doing really well.\"  Brady tells me that he is only had mild anxiety since his last appointment.  He denies any episodes of panic.  Brady denies any depressive symptoms at this time.  He apparently had been taking 5 mg of Lexapro prior to his last visit and finally increased the dose to 10 mg.  He tells me this adjustment has really helped his depressive symptoms.  Brady ultimately decided not to take the Wellbutrin offered at last appointment.  He is happy with his current medication regiment.  He only takes Xanax as needed.  He will complete a urine drug screen prior to next visit.  His current medication regimen includes Lexapro and Xanax.  He denies any side effects of his current medication regiment.  He denies any problems with sleep or appetite.  He denies any SI/HI/AVH    Current Medications:   Current Outpatient Medications   Medication Sig Dispense Refill   • ALPRAZolam (XANAX) 0.5 MG tablet Take 1 tablet by mouth At Night As Needed for Anxiety. 30 tablet 0   • Cholecalciferol (Vitamin D3 Immune Health) 25 MCG/10ML liquid Take 25 mcg by mouth Daily.     • escitalopram (Lexapro) 10 MG tablet Take 1 tablet by mouth Daily. 30 tablet 2   • TESTOSTERONE COMPOUNDING KIT 20 % cream Place  on the skin as directed by provider.       No current facility-administered medications for this visit.          Objective   Vital Signs:   /78 (BP Location: Left arm)   Pulse 71   Temp 97.1 °F (36.2 °C) (Infrared)   Resp 18   Ht 185.4 cm (73\")   Wt 111 kg (244 lb)   BMI 32.19 kg/m²     Physical Exam  Vitals signs and nursing note reviewed.   Constitutional:       Appearance: Normal appearance. He is well-developed.   Musculoskeletal: Normal range of motion.   Skin:     " General: Skin is warm and dry.   Neurological:      Mental Status: He is alert and oriented to person, place, and time.   Psychiatric:         Attention and Perception: Attention normal.         Mood and Affect: Mood normal.         Speech: Speech normal.         Behavior: Behavior normal. Behavior is cooperative.         Thought Content: Thought content normal.         Cognition and Memory: Cognition normal.         Judgment: Judgment normal.        Result Review :                   Assessment and Plan    Problem List Items Addressed This Visit     None      Visit Diagnoses     Current mild episode of major depressive disorder, unspecified whether recurrent (CMS/HCC)  (Chronic)   -  Primary    Relevant Medications    escitalopram (Lexapro) 10 MG tablet    ALPRAZolam (XANAX) 0.5 MG tablet    Anxiety disorder, unspecified type  (Chronic)       Relevant Medications    escitalopram (Lexapro) 10 MG tablet    ALPRAZolam (XANAX) 0.5 MG tablet    Panic disorder  (Chronic)       Relevant Medications    escitalopram (Lexapro) 10 MG tablet    ALPRAZolam (XANAX) 0.5 MG tablet    Medication management        Relevant Orders    Urine Drug Screen - Urine, Clean Catch          Mental Status Exam:   Hygiene:   good  Cooperation:  Cooperative  Eye Contact:  Good  Psychomotor Behavior:  Appropriate  Affect:  Full range  Mood: normal  Speech:  Normal  Thought Process:  Goal directed and Linear  Thought Content:  Normal  Suicidal:  None  Homicidal:  None  Hallucinations:  None  Delusion:  None  Memory:  Intact  Orientation:  Person, Place, Time and Situation  Reliability:  good  Insight:  Good  Judgement:  Good  Impulse Control:  Good  Physical/Medical Issues:  No      PHQ-9 Score:   PHQ-9 Total Score: 3    Impression/Plan:  -This is a follow up and medication check.  Brady is endorsing improved symptoms of depression.  He continues to experience mild anxiety but denies any episodes of panic.  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  He is happy with his current medication management as he adjusted his medications to increase Lexapro.  -Stop Wellbutrin  mg due to  Patient not taking this medication.                .   -Continue Lexapro 10 mg daily for depression and anxiety.   -Continue Xanax 0.5 mg as needed nightly for panic.  -The SILVESTRE report, reviewed through PDMP, of the past 12 months were reviewed and is appropriate.  The patient/guardian reports taking the medication only as prescribed.  The patient/guardian denies any abuse or misuse of the medication.  The patient/guardian denies any other substance use or issues.  There are no apparent substance related issues.  The patient reports no side effects of the current medication usage.  The patient/guardian has reported significant improvement with medication usage and wishes to continue medication as prescribed.  The patient/guardian is appropriate to continue with current medication usage at this time.  Reinforced risks and side effects of medication usage, patient and/or guardian verbalize understanding in their own words and are in agreement with current plan.  -Complete a UDS prior to next visit.    MEDS ORDERED DURING VISIT:  New Medications Ordered This Visit   Medications   • escitalopram (Lexapro) 10 MG tablet     Sig: Take 1 tablet by mouth Daily.     Dispense:  30 tablet     Refill:  2   • ALPRAZolam (XANAX) 0.5 MG tablet     Sig: Take 1 tablet by mouth At Night As Needed for Anxiety.     Dispense:  30 tablet     Refill:  0         Follow Up   Return in about 3 months (around 6/2/2021) for Medication Check.  Patient was given instructions and counseling regarding his condition or for health maintenance advice. Please see specific information pulled into the AVS if appropriate.       TREATMENT PLAN/GOALS: Continue supportive psychotherapy efforts and medications as indicated. Treatment and medication options discussed during  today's visit. Patient acknowledged and verbally consented to continue with current treatment plan and was educated on the importance of compliance with treatment and follow-up appointments.    MEDICATION ISSUES:  Discussed medication options and treatment plan of prescribed medication as well as the risks, benefits, and side effects including potential falls, possible impaired driving and metabolic adversities among others. Patient is agreeable to call the office with any worsening of symptoms or onset of side effects. Patient is agreeable to call 911 or go to the nearest ER should he/she begin having SI/HI.        This document has been electronically signed by TOBI Mays, PMHNP-BC  March 2, 2021 09:08 EST    Part of this note may be an electronic transcription/translation of spoken language to printed text using the Dragon Dictation System.

## 2021-06-02 ENCOUNTER — OFFICE VISIT (OUTPATIENT)
Dept: PSYCHIATRY | Facility: CLINIC | Age: 61
End: 2021-06-02

## 2021-06-02 ENCOUNTER — LAB (OUTPATIENT)
Dept: LAB | Facility: HOSPITAL | Age: 61
End: 2021-06-02

## 2021-06-02 VITALS
HEART RATE: 70 BPM | TEMPERATURE: 97.1 F | BODY MASS INDEX: 31.41 KG/M2 | HEIGHT: 73 IN | DIASTOLIC BLOOD PRESSURE: 90 MMHG | SYSTOLIC BLOOD PRESSURE: 144 MMHG | RESPIRATION RATE: 18 BRPM | WEIGHT: 237 LBS

## 2021-06-02 DIAGNOSIS — F41.9 ANXIETY DISORDER, UNSPECIFIED TYPE: Chronic | ICD-10-CM

## 2021-06-02 DIAGNOSIS — Z79.899 MEDICATION MANAGEMENT: ICD-10-CM

## 2021-06-02 DIAGNOSIS — F32.0 CURRENT MILD EPISODE OF MAJOR DEPRESSIVE DISORDER, UNSPECIFIED WHETHER RECURRENT (HCC): Primary | ICD-10-CM

## 2021-06-02 DIAGNOSIS — F41.0 PANIC DISORDER: Chronic | ICD-10-CM

## 2021-06-02 LAB
AMPHET+METHAMPHET UR QL: NEGATIVE
AMPHETAMINES UR QL: NEGATIVE
BARBITURATES UR QL SCN: NEGATIVE
BENZODIAZ UR QL SCN: NEGATIVE
BUPRENORPHINE SERPL-MCNC: NEGATIVE NG/ML
CANNABINOIDS SERPL QL: NEGATIVE
COCAINE UR QL: NEGATIVE
METHADONE UR QL SCN: NEGATIVE
OPIATES UR QL: NEGATIVE
OXYCODONE UR QL SCN: NEGATIVE
PCP UR QL SCN: NEGATIVE
PROPOXYPH UR QL: NEGATIVE
TRICYCLICS UR QL SCN: NEGATIVE

## 2021-06-02 PROCEDURE — 99214 OFFICE O/P EST MOD 30 MIN: CPT | Performed by: NURSE PRACTITIONER

## 2021-06-02 PROCEDURE — 80306 DRUG TEST PRSMV INSTRMNT: CPT

## 2021-06-02 RX ORDER — ESCITALOPRAM OXALATE 10 MG/1
10 TABLET ORAL DAILY
Qty: 30 TABLET | Refills: 2 | Status: SHIPPED | OUTPATIENT
Start: 2021-06-02 | End: 2021-08-10 | Stop reason: SDUPTHER

## 2021-06-02 NOTE — PROGRESS NOTES
"Chief Complaint  Depression, anxiety, and panic      Subjective          Brady Tijerina presents to [facility] by himself for a follow up and medication check.    History of Present Illness: Brady states, \"I have been doing good.  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  Brady tells me he feels his anxiety has improved with the addition of Lexapro.  He tells me that he only occasionally takes Xanax for panic.  He tells me he took his last dose last week prior to going to the dentist.  Brady continues to experience conflict within his family.  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 He also discusses a recent breakup and the difficulties he experiences dating or trying to find a partner.  Brady tells me that he only experiences mild depressive symptoms after several long days of work or poor sleep.  He denies any interference with work or relationships.  He currently takes Lexapro 10 mg daily and Xanax 0.5 mg only as needed.  He denies any side effects of his current medication regiment.  He denies any problems with sleep or appetite.  He denies any SI/HI/AVH.    Current Medications:   Current Outpatient Medications   Medication Sig Dispense Refill   • ALPRAZolam (XANAX) 0.5 MG tablet Take 1 tablet by mouth At Night As Needed for Anxiety. 30 tablet 0   • Cholecalciferol (Vitamin D3 Immune Health) 25 MCG/10ML liquid Take 25 mcg by mouth Daily.     • escitalopram (Lexapro) 10 MG tablet Take 1 tablet by mouth Daily. 30 tablet 2   • TESTOSTERONE COMPOUNDING KIT 20 % cream Place  on the skin as directed by provider.       No current facility-administered medications " "for this visit.         Objective   Vital Signs:   /90 (BP Location: Left arm)   Pulse 70   Temp 97.1 °F (36.2 °C) (Infrared)   Resp 18   Ht 185.4 cm (73\")   Wt 108 kg (237 lb)   BMI 31.27 kg/m²     Physical Exam  Vitals and nursing note reviewed.   Constitutional:       Appearance: Normal appearance. He is well-developed.   Musculoskeletal:         General: Normal range of motion.   Skin:     General: Skin is warm and dry.   Neurological:      Mental Status: He is alert and oriented to person, place, and time.   Psychiatric:         Attention and Perception: Attention normal.         Mood and Affect: Mood normal.         Speech: Speech normal.         Behavior: Behavior normal. Behavior is cooperative.         Thought Content: Thought content normal.         Cognition and Memory: Cognition normal.         Judgment: Judgment normal.        Result Review :     The following data was reviewed by: TOBI Rdz on 06/02/2021:    Urine Drug Screen - Urine, Clean Catch (06/02/2021 07:48)         Assessment and Plan    Problem List Items Addressed This Visit     None      Visit Diagnoses     Current mild episode of major depressive disorder, unspecified whether recurrent (CMS/HCC)    -  Primary    Relevant Medications    escitalopram (Lexapro) 10 MG tablet    Anxiety disorder, unspecified type  (Chronic)       Relevant Medications    escitalopram (Lexapro) 10 MG tablet    Panic disorder  (Chronic)       Relevant Medications    escitalopram (Lexapro) 10 MG tablet          Mental Status Exam:   Hygiene:   good  Cooperation:  Cooperative  Eye Contact:  Good  Psychomotor Behavior:  Appropriate  Affect:  Full range  Mood: normal  Speech:  Normal  Thought Process:  Goal directed and Linear  Thought Content:  Normal  Suicidal:  None  Homicidal:  None  Hallucinations:  None  Delusion:  None  Memory:  Intact  Orientation:  Person, Place, Time and Situation  Reliability:  good  Insight:  " Good  Judgement:  Good  Impulse Control:  Good  Physical/Medical Issues:  No      PHQ-9 Score:   PHQ-9 Total Score: 1    Impression/Plan:  -This is a follow up and medication check.    Brady reports improved symptoms of anxiety. He reports occasional panic but tells me the Xanax works well to manage symptoms. He reports rare symptoms of mild depression.  Brady continues to experience conflict within his family and has been dealing with a recent break-up.  He expresses his frustrations with dating and finding a suitable partner.  He also expresses his frustrations with his sisters' behaviors.  Brady is pleased with his current medication regiment and knows me he would like to continue at current doses.              -Continue Lexapro 10 mg daily for depression and anxiety.   -Continue Xanax 0.5 mg as needed nightly for panic.  Patient has refills.  -Brady completed a UDS and I reviewed official results. He was negative for all substances as expected. He was negative for benzodiazepines as his last dose was one week ago.  -The SILVESTRE report, reviewed through PDMP, of the past 12 months were reviewed and is appropriate.  The patient/guardian reports taking the medication only as prescribed.  The patient/guardian denies any abuse or misuse of the medication.  The patient/guardian denies any other substance use or issues. There are no apparent substance related issues.  The patient reports no side effects of the current medication usage.  The patient/guardian has reported significant improvement with medication usage and wishes to continue medication as prescribed.  The patient/guardian is appropriate to continue with current medication usage at this time.  Reinforced risks and side effects of medication usage, patient and/or guardian verbalize understanding in their own words and are in agreement with current plan.  -Complete a UDS prior to next visit.    MEDS ORDERED DURING VISIT:  New Medications Ordered This Visit    Medications   • escitalopram (Lexapro) 10 MG tablet     Sig: Take 1 tablet by mouth Daily.     Dispense:  30 tablet     Refill:  2         Follow Up   Return in about 3 months (around 9/2/2021) for Medication Check.  Patient was given instructions and counseling regarding his condition or for health maintenance advice. Please see specific information pulled into the AVS if appropriate.       TREATMENT PLAN/GOALS: Continue supportive psychotherapy efforts and medications as indicated. Treatment and medication options discussed during today's visit. Patient acknowledged and verbally consented to continue with current treatment plan and was educated on the importance of compliance with treatment and follow-up appointments.    MEDICATION ISSUES:  Discussed medication options and treatment plan of prescribed medication as well as the risks, benefits, and side effects including potential falls, possible impaired driving and metabolic adversities among others. Patient is agreeable to call the office with any worsening of symptoms or onset of side effects. Patient is agreeable to call 911 or go to the nearest ER should he/she begin having SI/HI.        This document has been electronically signed by TOBI Mays, PMHNP-BC  June 2, 2021 09:10 EDT    Part of this note may be an electronic transcription/translation of spoken language to printed text using the Dragon Dictation System.

## 2021-08-10 DIAGNOSIS — F41.0 PANIC DISORDER: Chronic | ICD-10-CM

## 2021-08-10 DIAGNOSIS — F32.0 CURRENT MILD EPISODE OF MAJOR DEPRESSIVE DISORDER, UNSPECIFIED WHETHER RECURRENT (HCC): ICD-10-CM

## 2021-08-10 RX ORDER — ALPRAZOLAM 0.5 MG/1
0.5 TABLET ORAL NIGHTLY PRN
Qty: 30 TABLET | Refills: 0 | Status: SHIPPED | OUTPATIENT
Start: 2021-08-10 | End: 2021-12-02 | Stop reason: SDUPTHER

## 2021-08-10 RX ORDER — ESCITALOPRAM OXALATE 10 MG/1
10 TABLET ORAL DAILY
Qty: 30 TABLET | Refills: 2 | Status: SHIPPED | OUTPATIENT
Start: 2021-08-10 | End: 2021-12-02 | Stop reason: SDUPTHER

## 2021-09-02 ENCOUNTER — OFFICE VISIT (OUTPATIENT)
Dept: PSYCHIATRY | Facility: CLINIC | Age: 61
End: 2021-09-02

## 2021-09-02 VITALS
HEART RATE: 78 BPM | BODY MASS INDEX: 32.07 KG/M2 | DIASTOLIC BLOOD PRESSURE: 88 MMHG | HEIGHT: 73 IN | WEIGHT: 242 LBS | SYSTOLIC BLOOD PRESSURE: 144 MMHG

## 2021-09-02 DIAGNOSIS — F41.0 PANIC DISORDER: Chronic | ICD-10-CM

## 2021-09-02 DIAGNOSIS — F41.9 ANXIETY DISORDER, UNSPECIFIED TYPE: Primary | Chronic | ICD-10-CM

## 2021-09-02 DIAGNOSIS — F32.0 CURRENT MILD EPISODE OF MAJOR DEPRESSIVE DISORDER, UNSPECIFIED WHETHER RECURRENT (HCC): ICD-10-CM

## 2021-09-02 PROCEDURE — 99214 OFFICE O/P EST MOD 30 MIN: CPT | Performed by: NURSE PRACTITIONER

## 2021-09-02 RX ORDER — CROMOLYN SODIUM 100 MG/5ML
SOLUTION, CONCENTRATE ORAL
COMMUNITY
Start: 2021-08-23 | End: 2022-06-02

## 2021-09-02 NOTE — PROGRESS NOTES
"Chief Complaint  Depression, anxiety, and panic      Subjective          Brady Tijerina presents to [facility] by himself for a follow up and medication check.    History of Present Illness: Brady states, \"business is good.  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  He is trying to manage his schedule and find time for all his customers.  He tells me it bothers him when he has to \"push someone out\" for service.  Brady is also trying to spend time on the weekends doing things that he enjoys or spending time with his family.  He states, \"I have some good days and bad days but I try not to think about it.\" Brady reports having mild depressive symptoms such as several down days, anhedonia, increased appetite, feelings of guilt towards himself, decreased concentration, psychomotor retardation, and passive suicidal ideations.  He adamantly denies any intent or plan for suicide.  Brady also reports having anxious symptoms such as feeling nervous, feeling easily annoyed or irritable, difficulty controlling his worry, and worrying about too many different things.  He does feel his current medication regiment is helping to manage his symptoms.  He is currently taking Lexapro 10 mg daily and Xanax 0.5 mg as needed for panic.  He denies any side effects of his current medication regiment.  He denies any problems with sleep.  He denies any problems with HI/AVH.    Current Medications:   Current Outpatient Medications   Medication Sig Dispense Refill   • ALPRAZolam (XANAX) 0.5 MG tablet TAKE 1 TABLET BY MOUTH AT NIGHT AS NEEDED FOR ANXIETY. 30 tablet 0   • Cholecalciferol (Vitamin D3 Immune Health) 25 MCG/10ML liquid Take 25 mcg by mouth Daily.     • cromolyn (GASTROCROM) 100 MG/5ML solution      • escitalopram (Lexapro) 10 MG tablet Take 1 tablet by mouth Daily. 30 tablet 2   • TESTOSTERONE COMPOUNDING KIT 20 % cream " "Place  on the skin as directed by provider.       No current facility-administered medications for this visit.         Objective   Vital Signs:   /88   Pulse 78   Ht 185.4 cm (73\")   Wt 110 kg (242 lb)   BMI 31.93 kg/m²     Physical Exam  Vitals and nursing note reviewed.   Constitutional:       Appearance: Normal appearance. He is well-developed.   Musculoskeletal:         General: Normal range of motion.   Skin:     General: Skin is warm and dry.   Neurological:      Mental Status: He is alert and oriented to person, place, and time.   Psychiatric:         Attention and Perception: Attention normal.         Mood and Affect: Mood normal.         Speech: Speech normal.         Behavior: Behavior normal. Behavior is cooperative.         Thought Content: Thought content normal.         Cognition and Memory: Cognition normal.         Judgment: Judgment normal.        Result Review :                   Assessment and Plan    Problem List Items Addressed This Visit     None      Visit Diagnoses     Anxiety disorder, unspecified type  (Chronic)   -  Primary    Panic disorder  (Chronic)       Current mild episode of major depressive disorder, unspecified whether recurrent (CMS/HCC)              Mental Status Exam:   Hygiene:   good  Cooperation:  Cooperative  Eye Contact:  Good  Psychomotor Behavior:  Appropriate  Affect:  Appropriate  Mood: normal  Speech:  Normal  Thought Process:  Goal directed and Linear  Thought Content:  Normal  Suicidal:  None  Homicidal:  None  Hallucinations:  None  Delusion:  None  Memory:  Intact  Orientation:  Person, Place, Time and Situation  Reliability:  good  Insight:  Good  Judgement:  Good  Impulse Control:  Good  Physical/Medical Issues:  No      PHQ-9 Score:   PHQ-9 Total Score: 7    Impression/Plan:  -This is a follow up and medication check.    Brady reports having a stable mood.  He does have some down days with depressive symptoms and anxiety; however, he is trying to stay " busy with work and engage in self-care on the weekends.  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  He also expresses his frustrations and upset for a recent vandalism on his family home.  Brady denies any problems with sleep.  He does have increased appetite at times.  He is pleased with his current medication regiment and wishes to continue at current doses.  -Continue Lexapro 10 mg daily for depression and anxiety. Patient has refills.  -Continue Xanax 0.5 mg as needed nightly for panic.  Patient has refills.  -The SILVESTRE report, reviewed through PDMP, of the past 12 months were reviewed and is appropriate.  The patient/guardian reports taking the medication only as prescribed.  The patient/guardian denies any abuse or misuse of the medication.  The patient/guardian denies any other substance use or issues. There are no apparent substance related issues.  The patient reports no side effects of the current medication usage.  The patient/guardian has reported significant improvement with medication usage and wishes to continue medication as prescribed.  The patient/guardian is appropriate to continue with current medication usage at this time.  Reinforced risks and side effects of medication usage, patient and/or guardian verbalize understanding in their own words and are in agreement with current plan.    MEDS ORDERED DURING VISIT:  No orders of the defined types were placed in this encounter.        Follow Up   Return in about 3 months (around 12/2/2021) for Medication Check.  Patient was given instructions and counseling regarding his condition or for health maintenance advice. Please see specific information pulled into the AVS if appropriate.       TREATMENT PLAN/GOALS: Continue supportive psychotherapy efforts and medications as indicated. Treatment and medication options discussed during today's visit. Patient acknowledged and verbally consented to continue with current treatment plan and was  educated on the importance of compliance with treatment and follow-up appointments.    MEDICATION ISSUES:  Discussed medication options and treatment plan of prescribed medication as well as the risks, benefits, and side effects including potential falls, possible impaired driving and metabolic adversities among others. Patient is agreeable to call the office with any worsening of symptoms or onset of side effects. Patient is agreeable to call 911 or go to the nearest ER should he/she begin having SI/HI.        This document has been electronically signed by TOBI Mays, PMHNP-BC  September 2, 2021 09:05 EDT    Part of this note may be an electronic transcription/translation of spoken language to printed text using the Dragon Dictation System.

## 2021-12-02 ENCOUNTER — OFFICE VISIT (OUTPATIENT)
Dept: PSYCHIATRY | Facility: CLINIC | Age: 61
End: 2021-12-02

## 2021-12-02 VITALS
WEIGHT: 243 LBS | HEIGHT: 73 IN | HEART RATE: 78 BPM | DIASTOLIC BLOOD PRESSURE: 80 MMHG | SYSTOLIC BLOOD PRESSURE: 134 MMHG | BODY MASS INDEX: 32.2 KG/M2

## 2021-12-02 DIAGNOSIS — F41.9 ANXIETY DISORDER, UNSPECIFIED TYPE: Chronic | ICD-10-CM

## 2021-12-02 DIAGNOSIS — F41.0 PANIC DISORDER: ICD-10-CM

## 2021-12-02 DIAGNOSIS — F32.0 CURRENT MILD EPISODE OF MAJOR DEPRESSIVE DISORDER, UNSPECIFIED WHETHER RECURRENT (HCC): Primary | Chronic | ICD-10-CM

## 2021-12-02 PROCEDURE — 99214 OFFICE O/P EST MOD 30 MIN: CPT | Performed by: NURSE PRACTITIONER

## 2021-12-02 RX ORDER — ESCITALOPRAM OXALATE 10 MG/1
10 TABLET ORAL DAILY
Qty: 90 TABLET | Refills: 0 | Status: SHIPPED | OUTPATIENT
Start: 2021-12-02 | End: 2022-06-02 | Stop reason: SDUPTHER

## 2021-12-02 RX ORDER — ALPRAZOLAM 0.5 MG/1
0.5 TABLET ORAL NIGHTLY PRN
Qty: 30 TABLET | Refills: 0 | Status: SHIPPED | OUTPATIENT
Start: 2021-12-02 | End: 2022-06-02 | Stop reason: SDUPTHER

## 2021-12-02 NOTE — PROGRESS NOTES
"Chief Complaint  Depression, anxiety, and panic      Subjective          Brady Tijerina presents to [facility] by himself for a follow up and medication check.    History of Present Illness: Brady states, \"I am okay. 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 He has two additional employees who doing \"fairly well.\" He is working a lot at this time but would like to travel after the new year. He reports being compliant with his medications. He is taking Lexapro 10 mg daily and Xanax 0.5 mg as needed. He denies any side effects of his current medication regiment. He denies any problems with sleep or appetite. He denies any SI/HI/AVH.    Current Medications:   Current Outpatient Medications   Medication Sig Dispense Refill   • ALPRAZolam (XANAX) 0.5 MG tablet Take 1 tablet by mouth At Night As Needed for Anxiety. 30 tablet 0   • Cholecalciferol (Vitamin D3 Immune Health) 25 MCG/10ML liquid Take 25 mcg by mouth Daily.     • cromolyn (GASTROCROM) 100 MG/5ML solution      • escitalopram (Lexapro) 10 MG tablet Take 1 tablet by mouth Daily. 90 tablet 0   • TESTOSTERONE COMPOUNDING KIT 20 % cream Place  on the skin as directed by provider.       No current facility-administered medications for this visit.         Objective   Vital Signs:   /80   Pulse 78   Ht 185.4 cm (73\")   Wt 110 kg (243 lb)   BMI 32.06 kg/m²     Physical Exam  Vitals and nursing note reviewed.   Constitutional:       Appearance: Normal appearance. He is well-developed.   Musculoskeletal:         General: Normal range of motion.   Skin:     General: Skin is warm and dry.   Neurological:      Mental Status: He is alert and oriented to person, place, and time.   Psychiatric:         Attention and Perception: Attention normal.         Mood and Affect: Mood normal.         Speech: Speech normal.         Behavior: Behavior normal. Behavior is cooperative.         Thought Content: " Thought content normal.         Cognition and Memory: Cognition normal.         Judgment: Judgment normal.        Result Review :                   Assessment and Plan    Problem List Items Addressed This Visit     None      Visit Diagnoses     Current mild episode of major depressive disorder, unspecified whether recurrent (HCC)  (Chronic)   -  Primary    Relevant Medications    escitalopram (Lexapro) 10 MG tablet    ALPRAZolam (XANAX) 0.5 MG tablet    Anxiety disorder, unspecified type  (Chronic)       Relevant Medications    escitalopram (Lexapro) 10 MG tablet    ALPRAZolam (XANAX) 0.5 MG tablet    Panic disorder        Relevant Medications    escitalopram (Lexapro) 10 MG tablet    ALPRAZolam (XANAX) 0.5 MG tablet          Mental Status Exam:   Hygiene:   good  Cooperation:  Cooperative  Eye Contact:  Good  Psychomotor Behavior:  Appropriate  Affect:  Appropriate  Mood: normal  Speech:  Normal  Thought Process:  Goal directed and Linear  Thought Content:  Normal  Suicidal:  None  Homicidal:  None  Hallucinations:  None  Delusion:  None  Memory:  Intact  Orientation:  Person, Place, Time and Situation  Reliability:  good  Insight:  Good  Judgement:  Good  Impulse Control:  Good  Physical/Medical Issues:  No      PHQ-9 Score:   PHQ-9 Total Score: 7    Impression/Plan:  -This is a follow up and medication check.    Brady reports stable mood. He is working and has plans to travel this upcoming year. Brady and I talked about adding an as needed medication for sleep as this is when he uses Xanax. I informed him of the long-term risks involved with benzodiazepine use and he feels this medication has been working well. He will consider an additional medication in the future. He is pleased with his medication regiment and would like to continue at current doses.  -Continue Lexapro 10 mg daily for depression and anxiety.   -Continue Xanax 0.5 mg as needed nightly for panic.    -The patient is on a long-term benzodiazepine  therapy which is needed for stable, consistent, and improved quality of life.  We have discussed potential risks and side effects including early onset dementia, addiction with continued use, sedation, fatigue, depression, dizziness, ataxia, slurred speech, weakness, forgetfulness, confusion, hyperexcitability, nervousness, hallucinations, roz or hypomania, hypotension, hypersalivation, dry mouth, respiratory depression especially when taken with CNS depressants and in overdose, rare hepatic dysfunction, rare renal dysfunction, blood dyscrasias, increased risk of falls, and impaired driving.  The patient is advised to not drive when taking a controlled substance.  The patient is also informed that the medication is to be used by the patient only, it is to be taken only as prescribed/directed, and to avoid any combined use of alcohol/ETOH or other substances unless prescribed and as directed by a Provider.  The patient verbalizes understanding and willingness in their own words to accept the potential side effects and risks for a better quality of life, and is currently in compliance and agreement with the plan of care.  A controlled substance agreement is on file, and the patient is in agreement with the terms of the controlled substance agreement.  The patient is advised that SILVESTRE reports will be reviewed periodically, as well as random drug screens will be performed periodically, and as needed.  -The SILVESTRE report, reviewed through PDMP, of the past 12 months were reviewed and is appropriate.  The patient/guardian reports taking the medication only as prescribed.  The patient/guardian denies any abuse or misuse of the medication.  The patient/guardian denies any other substance use or issues. There are no apparent substance related issues.  The patient reports no side effects of the current medication usage.  The patient/guardian has reported significant improvement with medication usage and wishes to continue  medication as prescribed.  The patient/guardian is appropriate to continue with current medication usage at this time.  Reinforced risks and side effects of medication usage, patient and/or guardian verbalize understanding in their own words and are in agreement with current plan.    MEDS ORDERED DURING VISIT:  New Medications Ordered This Visit   Medications   • escitalopram (Lexapro) 10 MG tablet     Sig: Take 1 tablet by mouth Daily.     Dispense:  90 tablet     Refill:  0   • ALPRAZolam (XANAX) 0.5 MG tablet     Sig: Take 1 tablet by mouth At Night As Needed for Anxiety.     Dispense:  30 tablet     Refill:  0     08/10/2021 10:35:09 AM         Follow Up   Return in about 6 months (around 6/2/2022) for Medication Check.  Patient was given instructions and counseling regarding his condition or for health maintenance advice. Please see specific information pulled into the AVS if appropriate.       TREATMENT PLAN/GOALS: Continue supportive psychotherapy efforts and medications as indicated. Treatment and medication options discussed during today's visit. Patient acknowledged and verbally consented to continue with current treatment plan and was educated on the importance of compliance with treatment and follow-up appointments.    MEDICATION ISSUES:  Discussed medication options and treatment plan of prescribed medication as well as the risks, benefits, and side effects including potential falls, possible impaired driving and metabolic adversities among others. Patient is agreeable to call the office with any worsening of symptoms or onset of side effects. Patient is agreeable to call 911 or go to the nearest ER should he/she begin having SI/HI.        This document has been electronically signed by TOBI Mays, PMHNP-BC  December 2, 2021 12:58 EST    Part of this note may be an electronic transcription/translation of spoken language to printed text using the Dragon Dictation System.

## 2022-06-01 NOTE — PROGRESS NOTES
"Chief Complaint  Depression, anxiety, and panic      Subjective          Brady Tijerina presents to [facility] by himself for a follow up and medication check.    History of Present Illness: Brady states, \"I am good.\"  Brady tells me that he is staying busy with work.  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  He has been working in [facility] for the month of May.  He has not done any recent traveling for pleasure.  He has been dating.  He tells me that he has worries at times, mostly about work.  He tells me he worries more in the morning than at night.  He does occasionally wake at night with worry and then will get up to go to the bathroom but can usually return to sleep.  He feels medication is beneficial but also talking about his worries.  He is compliant with his medications.  He is currently taking Lexapro and Xanax. He denies any side effects of his current medication regiment. He denies any problems with sleep or appetite. He denies any SI/HI/AVH.    Current Medications:   Current Outpatient Medications   Medication Sig Dispense Refill   • ALPRAZolam (XANAX) 0.5 MG tablet Take 1 tablet by mouth At Night As Needed for Anxiety. 30 tablet 0   • escitalopram (Lexapro) 10 MG tablet Take 1 tablet by mouth Daily. 90 tablet 0   • latanoprost (XALATAN) 0.005 % ophthalmic solution Administer 1 drop to both eyes every night at bedtime.     • TESTOSTERONE COMPOUNDING KIT 20 % cream Place  on the skin as directed by provider.     • timolol (TIMOPTIC) 0.25 % ophthalmic solution INSTILL 1 DROP INTO RIGHT EYE ONCE A DAY       No current facility-administered medications for this visit.         Objective   Vital Signs:   /78   Pulse 74   Ht 185.4 cm (73\")   Wt 108 kg (239 lb)   BMI 31.53 kg/m²     Physical Exam  Vitals and nursing note reviewed.   Constitutional:       Appearance: Normal appearance. He is well-developed. "   Musculoskeletal:         General: Normal range of motion.   Skin:     General: Skin is warm and dry.   Neurological:      Mental Status: He is alert and oriented to person, place, and time.   Psychiatric:         Attention and Perception: Attention normal.         Mood and Affect: Mood normal.         Speech: Speech normal.         Behavior: Behavior normal. Behavior is cooperative.         Thought Content: Thought content normal.         Cognition and Memory: Cognition normal.         Judgment: Judgment normal.        Result Review :                   Assessment and Plan    Problem List Items Addressed This Visit    None     Visit Diagnoses     Anxiety disorder, unspecified type  (Chronic)   -  Primary    Relevant Medications    escitalopram (Lexapro) 10 MG tablet    ALPRAZolam (XANAX) 0.5 MG tablet    Medication management        Relevant Orders    Urine Drug Screen - Urine, Clean Catch    Current mild episode of major depressive disorder, unspecified whether recurrent (HCC)  (Chronic)       Relevant Medications    escitalopram (Lexapro) 10 MG tablet    ALPRAZolam (XANAX) 0.5 MG tablet    Panic disorder  (Chronic)       Relevant Medications    escitalopram (Lexapro) 10 MG tablet    ALPRAZolam (XANAX) 0.5 MG tablet          Mental Status Exam:   Hygiene:   good  Cooperation:  Cooperative  Eye Contact:  Good  Psychomotor Behavior:  Appropriate  Affect:  Appropriate  Mood: normal  Speech:  Normal  Thought Process:  Goal directed and Linear  Thought Content:  Normal  Suicidal:  None  Homicidal:  None  Hallucinations:  None  Delusion:  None  Memory:  Intact  Orientation:  Person, Place, Time and Situation  Reliability:  good  Insight:  Good  Judgement:  Good  Impulse Control:  Good  Physical/Medical Issues:  No      PHQ-9 Score:   PHQ-9 Total Score:      Impression/Plan:  -This is a follow up and medication check.    Brady reports a stable mood.  He has occasional worries about work but feels his medication and  talking about his worries are beneficial.  He worries mostly in the morning.  He takes his medication in the morning.  He uses Xanax very sparingly as needed.  He denies any problems with sleep or appetite.  He is pleased with his current medication regimen and would like to continue at current doses.  -Continue Lexapro 10 mg daily for depression and anxiety.   -Continue Xanax 0.5 mg as needed nightly for panic.    -The patient is on a long-term benzodiazepine therapy which is needed for stable, consistent, and improved quality of life.  We have discussed potential risks and side effects including early onset dementia, addiction with continued use, sedation, fatigue, depression, dizziness, ataxia, slurred speech, weakness, forgetfulness, confusion, hyperexcitability, nervousness, hallucinations, roz or hypomania, hypotension, hypersalivation, dry mouth, respiratory depression especially when taken with CNS depressants and in overdose, rare hepatic dysfunction, rare renal dysfunction, blood dyscrasias, increased risk of falls, and impaired driving.  The patient is advised to not drive when taking a controlled substance.  The patient is also informed that the medication is to be used by the patient only, it is to be taken only as prescribed/directed, and to avoid any combined use of alcohol/ETOH or other substances unless prescribed and as directed by a Provider.  The patient verbalizes understanding and willingness in their own words to accept the potential side effects and risks for a better quality of life, and is currently in compliance and agreement with the plan of care.  A controlled substance agreement is on file, and the patient is in agreement with the terms of the controlled substance agreement.  The patient is advised that SILVESTRE reports will be reviewed periodically, as well as random drug screens will be performed periodically, and as needed.  -The SILVESTRE report, reviewed through PDMP, of the past 12  months were reviewed and is appropriate.  The patient/guardian reports taking the medication only as prescribed.  The patient/guardian denies any abuse or misuse of the medication.  The patient/guardian denies any other substance use or issues. There are no apparent substance related issues.  The patient reports no side effects of the current medication usage.  The patient/guardian has reported significant improvement with medication usage and wishes to continue medication as prescribed.  The patient/guardian is appropriate to continue with current medication usage at this time.  Reinforced risks and side effects of medication usage, patient and/or guardian verbalize understanding in their own words and are in agreement with current plan.  -Last urine drug screen June 2021.  Appropriate.  Orders have been placed for another urine drug screen for compliance.  I am expecting Brady to be positive for benzodiazepines and negative for all other illicit substances.  I will review the official results.    MEDS ORDERED DURING VISIT:  New Medications Ordered This Visit   Medications   • escitalopram (Lexapro) 10 MG tablet     Sig: Take 1 tablet by mouth Daily.     Dispense:  90 tablet     Refill:  0   • ALPRAZolam (XANAX) 0.5 MG tablet     Sig: Take 1 tablet by mouth At Night As Needed for Anxiety.     Dispense:  30 tablet     Refill:  0     08/10/2021 10:35:09 AM         Follow Up   Return in about 6 months (around 12/2/2022) for Medication Check.  Patient was given instructions and counseling regarding his condition or for health maintenance advice. Please see specific information pulled into the AVS if appropriate.       TREATMENT PLAN/GOALS: Continue supportive psychotherapy efforts and medications as indicated. Treatment and medication options discussed during today's visit. Patient acknowledged and verbally consented to continue with current treatment plan and was educated on the importance of compliance with treatment  and follow-up appointments.    MEDICATION ISSUES:  Discussed medication options and treatment plan of prescribed medication as well as the risks, benefits, and side effects including potential falls, possible impaired driving and metabolic adversities among others. Patient is agreeable to call the office with any worsening of symptoms or onset of side effects. Patient is agreeable to call 911 or go to the nearest ER should he/she begin having SI/HI.        This document has been electronically signed by TOBI Mays, PMHNP-BC  June 2, 2022 10:59 EDT    Part of this note may be an electronic transcription/translation of spoken language to printed text using the Dragon Dictation System.

## 2022-06-02 ENCOUNTER — OFFICE VISIT (OUTPATIENT)
Dept: PSYCHIATRY | Facility: CLINIC | Age: 62
End: 2022-06-02

## 2022-06-02 VITALS
HEIGHT: 73 IN | BODY MASS INDEX: 31.68 KG/M2 | HEART RATE: 74 BPM | DIASTOLIC BLOOD PRESSURE: 78 MMHG | SYSTOLIC BLOOD PRESSURE: 122 MMHG | WEIGHT: 239 LBS

## 2022-06-02 DIAGNOSIS — F41.9 ANXIETY DISORDER, UNSPECIFIED TYPE: Primary | Chronic | ICD-10-CM

## 2022-06-02 DIAGNOSIS — Z79.899 MEDICATION MANAGEMENT: ICD-10-CM

## 2022-06-02 DIAGNOSIS — F41.0 PANIC DISORDER: Chronic | ICD-10-CM

## 2022-06-02 DIAGNOSIS — F32.0 CURRENT MILD EPISODE OF MAJOR DEPRESSIVE DISORDER, UNSPECIFIED WHETHER RECURRENT: Chronic | ICD-10-CM

## 2022-06-02 PROCEDURE — 99214 OFFICE O/P EST MOD 30 MIN: CPT | Performed by: NURSE PRACTITIONER

## 2022-06-02 RX ORDER — ALPRAZOLAM 0.5 MG/1
0.5 TABLET ORAL NIGHTLY PRN
Qty: 30 TABLET | Refills: 0 | Status: SHIPPED | OUTPATIENT
Start: 2022-06-02 | End: 2023-03-28

## 2022-06-02 RX ORDER — ESCITALOPRAM OXALATE 10 MG/1
10 TABLET ORAL DAILY
Qty: 90 TABLET | Refills: 0 | Status: SHIPPED | OUTPATIENT
Start: 2022-06-02 | End: 2022-10-13

## 2022-06-02 RX ORDER — GLIMEPIRIDE 2 MG/1
TABLET ORAL
COMMUNITY
Start: 2022-05-20

## 2022-06-02 RX ORDER — LATANOPROST 50 UG/ML
1 SOLUTION/ DROPS OPHTHALMIC
COMMUNITY
Start: 2022-05-20

## 2022-10-13 DIAGNOSIS — F32.0 CURRENT MILD EPISODE OF MAJOR DEPRESSIVE DISORDER, UNSPECIFIED WHETHER RECURRENT: Chronic | ICD-10-CM

## 2022-10-13 RX ORDER — ESCITALOPRAM OXALATE 10 MG/1
10 TABLET ORAL DAILY
Qty: 90 TABLET | Refills: 0 | Status: SHIPPED | OUTPATIENT
Start: 2022-10-13 | End: 2022-12-08

## 2022-12-05 NOTE — PROGRESS NOTES
"Chief Complaint  Depression, anxiety, and panic      Subjective          Brady Tijerina presents to [facility] by himself for a follow up and medication check.    History of Present Illness: Brady states, \"I am pretty good.\" brady tells me he has more anxiety than usual as he has decreased his Lexapro dose do to experiencing sexual side effects of the SSRI. He is not able to achieve climax which he reports is uncomfortable and frustrating for him and partner. He has tried several SSRIs and Trintellix, Wellbutrin XL, and Effexor XR in the past. He feels Lexapro has worked the best to manage his anxiety. He only takes Xanax in rare occasions. He sees a homeopathic physician who recommended he switch therapy based on a [place] Stress Assessment and is hoping for some suggestions. He is currently taking Lexapro 2.5 mg daily for two weeks and Xanax 0.5 mg as needed.  He denies any problems with sleep or appetite. He denies any SI/HI/AVH.    Current Medications:   Current Outpatient Medications   Medication Sig Dispense Refill   • ALPRAZolam (XANAX) 0.5 MG tablet Take 1 tablet by mouth At Night As Needed for Anxiety. 30 tablet 0   • escitalopram (LEXAPRO) 10 MG tablet TAKE 1 TABLET BY MOUTH DAILY. 90 tablet 0   • latanoprost (XALATAN) 0.005 % ophthalmic solution Administer 1 drop to both eyes every night at bedtime.     • TESTOSTERONE COMPOUNDING KIT 20 % cream Place  on the skin as directed by provider.     • timolol (TIMOPTIC) 0.25 % ophthalmic solution INSTILL 1 DROP INTO RIGHT EYE ONCE A DAY       No current facility-administered medications for this visit.         Objective   Vital Signs:   /80   Pulse 76   Ht 185.4 cm (73\")   Wt 112 kg (247 lb)   BMI 32.59 kg/m²     Physical Exam  Vitals and nursing note reviewed.   Constitutional:       Appearance: Normal appearance. He is well-developed.   Musculoskeletal:         General: Normal range of motion.   Skin:     " General: Skin is warm and dry.   Neurological:      Mental Status: He is alert and oriented to person, place, and time.   Psychiatric:         Attention and Perception: Attention normal.         Mood and Affect: Mood normal.         Speech: Speech normal.         Behavior: Behavior normal. Behavior is cooperative.         Thought Content: Thought content normal.         Cognition and Memory: Cognition normal.         Judgment: Judgment normal.        Result Review :          Assessment and Plan    Problem List Items Addressed This Visit    None  Visit Diagnoses     CHICO (generalized anxiety disorder)  (Chronic)   -  Primary    Medication management        Relevant Orders    Compliance Drug Analysis, Ur - Urine, Clean Catch    Current mild episode of major depressive disorder, unspecified whether recurrent (HCC)  (Chronic)       Panic disorder  (Chronic)             Mental Status Exam:   Hygiene:   good  Cooperation:  Cooperative  Eye Contact:  Good  Psychomotor Behavior:  Appropriate  Affect:  Appropriate  Mood: normal  Speech:  Normal  Thought Process:  Goal directed and Linear  Thought Content:  Normal  Suicidal:  None  Homicidal:  None  Hallucinations:  None  Delusion:  None  Memory:  Intact  Orientation:  Person, Place, Time and Situation  Reliability:  good  Insight:  Good  Judgement:  Good  Impulse Control:  Good  Physical/Medical Issues:  No      PHQ-9 Score:   PHQ-9 Total Score:      Impression/Plan:  -This is a follow up and medication check. Brady reports experiencing sexual side effects from Lexapro. He was without the medication for several days and the problem resolved but he did not feel weel, likely due to the discontinuation syndrome associated with Lexapro. He would like to consider an alternative and review with his homeopathic physician. I suggested Luvox or Viibryd based on the knowledge of medication he has taken in the past, his effects of those, and his current symptoms. He will keep me  informed if there is a medication change. I imentioned the availability of a GeneSight test which can be costly without insurance coverage but may help him in hte future to determine how he metabolizes psychiatric medications.   -Continue Lexapro 2.5  mg daily for depression and anxiety. Patient has refills.  -Continue Xanax 0.5 mg as needed nightly for panic.  Patient has refills.  -The SILVESTRE report, reviewed through PDMP, of the past 12 months were reviewed and is appropriate.  The patient/guardian reports taking the medication only as prescribed.  The patient/guardian denies any abuse or misuse of the medication.  The patient/guardian denies any other substance use or issues. There are no apparent substance related issues.  The patient reports no side effects of the current medication usage.  The patient/guardian has reported significant improvement with medication usage and wishes to continue medication as prescribed.  The patient/guardian is appropriate to continue with current medication usage at this time.  Reinforced risks and side effects of medication usage, patient and/or guardian verbalize understanding in their own words and are in agreement with current plan.  -Last urine drug screen June 2021.  Appropriate.  Orders have been placed for another urine drug screen for compliance.  I am expecting Brady to be positive for benzodiazepines and negative for all other illicit substances.  I will review the official results.    MEDS ORDERED DURING VISIT:  No orders of the defined types were placed in this encounter.        Follow Up   Return in about 2 months (around 2/8/2023) for Medication Check.  Patient was given instructions and counseling regarding his condition or for health maintenance advice. Please see specific information pulled into the AVS if appropriate.       TREATMENT PLAN/GOALS: Continue supportive psychotherapy efforts and medications as indicated. Treatment and medication options discussed  during today's visit. Patient acknowledged and verbally consented to continue with current treatment plan and was educated on the importance of compliance with treatment and follow-up appointments.    MEDICATION ISSUES:  Discussed medication options and treatment plan of prescribed medication as well as the risks, benefits, and side effects including potential falls, possible impaired driving and metabolic adversities among others. Patient is agreeable to call the office with any worsening of symptoms or onset of side effects. Patient is agreeable to call 911 or go to the nearest ER should he/she begin having SI/HI.        This document has been electronically signed by TOBI Mays, PMHNP-BC  December 8, 2022 10:20 EST    Part of this note may be an electronic transcription/translation of spoken language to printed text using the Dragon Dictation System.

## 2022-12-08 ENCOUNTER — OFFICE VISIT (OUTPATIENT)
Dept: PSYCHIATRY | Facility: CLINIC | Age: 62
End: 2022-12-08

## 2022-12-08 ENCOUNTER — TELEPHONE (OUTPATIENT)
Dept: PSYCHIATRY | Facility: CLINIC | Age: 62
End: 2022-12-08

## 2022-12-08 VITALS
SYSTOLIC BLOOD PRESSURE: 124 MMHG | HEART RATE: 76 BPM | HEIGHT: 73 IN | BODY MASS INDEX: 32.74 KG/M2 | DIASTOLIC BLOOD PRESSURE: 80 MMHG | WEIGHT: 247 LBS

## 2022-12-08 DIAGNOSIS — F41.1 GAD (GENERALIZED ANXIETY DISORDER): Primary | Chronic | ICD-10-CM

## 2022-12-08 DIAGNOSIS — F32.0 CURRENT MILD EPISODE OF MAJOR DEPRESSIVE DISORDER, UNSPECIFIED WHETHER RECURRENT: Chronic | ICD-10-CM

## 2022-12-08 DIAGNOSIS — F41.0 PANIC DISORDER: Chronic | ICD-10-CM

## 2022-12-08 DIAGNOSIS — Z79.899 MEDICATION MANAGEMENT: ICD-10-CM

## 2022-12-08 DIAGNOSIS — F32.0 CURRENT MILD EPISODE OF MAJOR DEPRESSIVE DISORDER, UNSPECIFIED WHETHER RECURRENT: ICD-10-CM

## 2022-12-08 DIAGNOSIS — F41.1 GAD (GENERALIZED ANXIETY DISORDER): Primary | ICD-10-CM

## 2022-12-08 PROCEDURE — 99214 OFFICE O/P EST MOD 30 MIN: CPT | Performed by: NURSE PRACTITIONER

## 2022-12-08 RX ORDER — VILAZODONE HYDROCHLORIDE 10 MG/1
TABLET ORAL
Qty: 42 TABLET | Refills: 0 | Status: SHIPPED | OUTPATIENT
Start: 2022-12-08 | End: 2022-12-11 | Stop reason: SDUPTHER

## 2022-12-08 NOTE — TELEPHONE ENCOUNTER
Brady called in to say he spoke with his Holistic doctor and Viibryd was suggested as best med for him to try. He asked if you would be willing to send that in for him? Thanks

## 2022-12-08 NOTE — TELEPHONE ENCOUNTER
Of course. I will send 10 mg in to take for two weeks and he can try 20 mg then. Take daily and take with food to help absorption. Once he starts the Viibryd, have him only take Lexapro every other day for a week and then stop.

## 2022-12-09 ENCOUNTER — TELEPHONE (OUTPATIENT)
Dept: PSYCHIATRY | Facility: CLINIC | Age: 62
End: 2022-12-09

## 2022-12-09 NOTE — TELEPHONE ENCOUNTER
Brady called in to say the Viibryd is $450. He does not have insurance. Any other options or advisement? He is aware it will be Monday. Thank you

## 2022-12-11 DIAGNOSIS — F41.1 GAD (GENERALIZED ANXIETY DISORDER): ICD-10-CM

## 2022-12-11 DIAGNOSIS — F32.0 CURRENT MILD EPISODE OF MAJOR DEPRESSIVE DISORDER, UNSPECIFIED WHETHER RECURRENT: ICD-10-CM

## 2022-12-11 RX ORDER — VILAZODONE HYDROCHLORIDE 10 MG/1
10 TABLET ORAL DAILY
Qty: 30 TABLET | Refills: 1 | Status: SHIPPED | OUTPATIENT
Start: 2022-12-11 | End: 2022-12-12 | Stop reason: SDUPTHER

## 2022-12-12 DIAGNOSIS — F41.1 GAD (GENERALIZED ANXIETY DISORDER): ICD-10-CM

## 2022-12-12 DIAGNOSIS — F32.0 CURRENT MILD EPISODE OF MAJOR DEPRESSIVE DISORDER, UNSPECIFIED WHETHER RECURRENT: ICD-10-CM

## 2022-12-12 RX ORDER — VILAZODONE HYDROCHLORIDE 10 MG/1
10 TABLET ORAL DAILY
Qty: 30 TABLET | Refills: 1 | Status: SHIPPED | OUTPATIENT
Start: 2022-12-12 | End: 2023-02-02

## 2022-12-12 NOTE — TELEPHONE ENCOUNTER
Ramya, can you resend the [facility]ryd to [facility]? I think that is where you said he could use the Good Rx coupon and get for $40. thanks

## 2022-12-18 LAB — DRUGS UR: NORMAL

## 2023-02-01 NOTE — PROGRESS NOTES
"Chief Complaint  Depression, anxiety, and panic      Subjective          Brady Tijerina presents to [facility] by himself for a follow up and medication check.    History of Present Illness: Brady states, \"I guess I had what you call a psychotic event last night.\" Brady tells me he had been taking 20 mg of Viibryd night since he had not felt an improvement in his depression, only anxiety. He tells me he has been having crazy dreams which are distorted like nightmares and cause him to wake in a panic. He tells me he has been fearful to sleep due to these symptoms. He tells me he stopped the Viibryd but still felt \"disorted\" himself last night. He has been in communication with his holistic physician and she recommended an appointment with me to discuss as he will likely always need a medication for depression due to family history. He was taking 20 mg for 5 days in the morning. He reports moderate symptoms of depression and anxiety. He used Xanax last night but does not feel it helped as it usually does.  He is currently taking Viibryd 20 mg daily and Xanax 0.5 mg as needed.  He denies any problems with sleep or appetite. He denies any SI/HI/AVH.    Current Medications:   Current Outpatient Medications   Medication Sig Dispense Refill   • ALPRAZolam (XANAX) 0.5 MG tablet Take 1 tablet by mouth At Night As Needed for Anxiety. 30 tablet 0   • latanoprost (XALATAN) 0.005 % ophthalmic solution Administer 1 drop to both eyes every night at bedtime.     • TESTOSTERONE COMPOUNDING KIT 20 % cream Place  on the skin as directed by provider.     • timolol (TIMOPTIC) 0.25 % ophthalmic solution INSTILL 1 DROP INTO RIGHT EYE ONCE A DAY       No current facility-administered medications for this visit.         Objective   Vital Signs:   /82   Pulse 78   Ht 185.4 cm (73\")   Wt 112 kg (247 lb)   BMI 32.59 kg/m²     Physical Exam  Vitals and nursing note reviewed. "   Constitutional:       Appearance: Normal appearance. He is well-developed.   Musculoskeletal:         General: Normal range of motion.   Skin:     General: Skin is warm and dry.   Neurological:      Mental Status: He is alert and oriented to person, place, and time.   Psychiatric:         Attention and Perception: Attention normal.         Mood and Affect: Mood is anxious and depressed.         Speech: Speech normal.         Behavior: Behavior normal. Behavior is cooperative.         Thought Content: Thought content normal.         Cognition and Memory: Cognition normal.         Judgment: Judgment normal.        Result Review :          Assessment and Plan    Problem List Items Addressed This Visit    None  Visit Diagnoses     Moderate recurrent major depression (HCC)  (Chronic)   -  Primary    CHICO (generalized anxiety disorder)  (Chronic)       Panic disorder  (Chronic)             Mental Status Exam:   Hygiene:   good  Cooperation:  Cooperative  Eye Contact:  Good  Psychomotor Behavior:  Appropriate  Affect:  Appropriate  Mood: depressed and anxious  Speech:  Normal  Thought Process:  Goal directed and Linear  Thought Content:  Normal  Suicidal:  None  Homicidal:  None  Hallucinations:  None  Delusion:  None  Memory:  Intact  Orientation:  Person, Place, Time and Situation  Reliability:  good  Insight:  Good  Judgement:  Good  Impulse Control:  Good  Physical/Medical Issues:  No      PHQ-9 Score:   PHQ-9 Total Score: 14     Impression/Plan:  -This is a follow up and medication check. Brady reports moderately depressed mood and anxiety. He has been experiencing an adverse reaction to Viibryd possibly due to increase levels of dopamine as he describes vivid, distorted dreams that are like nightmares and scare him. He has communicated with his holistic physician whom he feels knows his history very well. They recommended he reach out to discuss with me since he will likely always need an antidepressant. He reports  fear and concern for trying something else at this time. I suggested taking 1/2 tablet of Viibryd for two more days and then stopping to prevent discontinuation. I suggested using the supplement L-methylfolate as this can be a beneficial treatment for depression and not involve using an SSRI. I also provided education about the medication Lamotrigine which may have benefits on mood and anxiety without the adverse effects of SSRIs. He is going to look into treatment for sleep apnea as he is aware this can affect his health if left untreated. I provided information on a new form of treatment with a hypoglossal stimulator.   -Stop Viibryd due to adverse effects.  -Start L-Methylfolate 7.5 mg daily for depression.   -Continue Xanax 0.5 mg as needed nightly for panic.  Patient has refills.  -The SILVESTRE report, reviewed through PDMP, of the past 12 months were reviewed and is appropriate.  The patient/guardian reports taking the medication only as prescribed.  The patient/guardian denies any abuse or misuse of the medication.  The patient/guardian denies any other substance use or issues. There are no apparent substance related issues.  The patient reports no side effects of the current medication usage.  The patient/guardian has reported significant improvement with medication usage and wishes to continue medication as prescribed.  The patient/guardian is appropriate to continue with current medication usage at this time.  Reinforced risks and side effects of medication usage, patient and/or guardian verbalize understanding in their own words and are in agreement with current plan.  -Last urine drug screen June 2021.  Appropriate.  Orders have been placed for another urine drug screen for compliance.  I am expecting Brady to be positive for benzodiazepines and negative for all other illicit substances.  I will review the official results.    MEDS ORDERED DURING VISIT:  No orders of the defined types were placed in this  encounter.        Follow Up   Return in about 2 weeks (around 2/16/2023) for Medication Check.  Patient was given instructions and counseling regarding his condition or for health maintenance advice. Please see specific information pulled into the AVS if appropriate.       TREATMENT PLAN/GOALS: Continue supportive psychotherapy efforts and medications as indicated. Treatment and medication options discussed during today's visit. Patient acknowledged and verbally consented to continue with current treatment plan and was educated on the importance of compliance with treatment and follow-up appointments.    MEDICATION ISSUES:  Discussed medication options and treatment plan of prescribed medication as well as the risks, benefits, and side effects including potential falls, possible impaired driving and metabolic adversities among others. Patient is agreeable to call the office with any worsening of symptoms or onset of side effects. Patient is agreeable to call 911 or go to the nearest ER should he/she begin having SI/HI.        This document has been electronically signed by TOBI Mays, PMHNP-BC  February 2, 2023 10:55 EST    Part of this note may be an electronic transcription/translation of spoken language to printed text using the Dragon Dictation System.

## 2023-02-02 ENCOUNTER — OFFICE VISIT (OUTPATIENT)
Dept: PSYCHIATRY | Facility: CLINIC | Age: 63
End: 2023-02-02

## 2023-02-02 ENCOUNTER — TELEPHONE (OUTPATIENT)
Dept: PSYCHIATRY | Facility: CLINIC | Age: 63
End: 2023-02-02

## 2023-02-02 VITALS
SYSTOLIC BLOOD PRESSURE: 142 MMHG | HEART RATE: 78 BPM | HEIGHT: 73 IN | DIASTOLIC BLOOD PRESSURE: 82 MMHG | BODY MASS INDEX: 32.74 KG/M2 | WEIGHT: 247 LBS

## 2023-02-02 DIAGNOSIS — F33.1 MODERATE RECURRENT MAJOR DEPRESSION: Primary | Chronic | ICD-10-CM

## 2023-02-02 DIAGNOSIS — F41.1 GAD (GENERALIZED ANXIETY DISORDER): Chronic | ICD-10-CM

## 2023-02-02 DIAGNOSIS — F41.0 PANIC DISORDER: Chronic | ICD-10-CM

## 2023-02-02 DIAGNOSIS — F33.1 MODERATE RECURRENT MAJOR DEPRESSION: Primary | ICD-10-CM

## 2023-02-02 PROCEDURE — 99214 OFFICE O/P EST MOD 30 MIN: CPT | Performed by: NURSE PRACTITIONER

## 2023-02-02 RX ORDER — LEVOMEFOLATE CALCIUM 15 MG
7.5 TABLET ORAL DAILY
Qty: 30 TABLET | Refills: 1 | Status: SHIPPED | OUTPATIENT
Start: 2023-02-02

## 2023-02-02 NOTE — TELEPHONE ENCOUNTER
Brady called in to say he cannot find the Methylfolate 7.5 mg. The only place he has located it is [facility] in [place], and they have it in prescribed form of 15 mg. Was advised it is a tablet and can be split in half. He asked if you would send in script for the med to [facility] and he would take 1/2 tab of 15 mg. Please advise.

## 2023-02-03 NOTE — TELEPHONE ENCOUNTER
Please let Brady know that I am unfamiliar with the conversion of the dosing but I have consulted with a pharmacist and she will be getting back to me tomorrow. I will update you as soon as possible so patient can stay informed.

## 2023-02-10 NOTE — PROGRESS NOTES
"This provider is located at [facility],  [facility], [address],[facility], using a telephone in a secure private environment. The Patient is seen remotely at their home address in [place], using a private telephone.  The patient is unable to be seen through a MyChart Video Visit through [facility] at today's encounter due to technical difficulties, therefore a telephone encounter was conducted. Patient is being evaluated/treated via telehealth by telephone, and stated they are in a secure environment for this session. The patient's condition being diagnosed/treated is appropriate for telemedicine. The provider identified herself as well as her credentials.   The patient, and/or patient's guardian, consent to be seen remotely, and when consent is given they understand that the consent allows for patient identifiable information to be sent to a third party as needed.   They may refuse to be seen remotely at any time. The electronic data is encrypted and password protected, and the patient and/or guardian has been advised of the potential risks to privacy not withstanding such measures.    You have chosen to receive care through a telephone visit. Do you consent to use a telephone visit for your medical care today? Yes  This visit has been rescheduled as a phone visit to comply with patient safety concerns in accordance with CDC recommendations.    Chief Complaint  Depression, anxiety, and panic      Subjective          Brady Tijerina presents via telephone due to technical difficulties by himself for a follow up and medication check.    History of Present Illness: Brady states, \"I am doing okay.\" Brady tells me he spent the week in [place] on vacation. He was feeling anxious after stopping the Viibryd as directed. She resumed to every other day as directed by his holistic physician. He started the L-methylfolate. Between the two, he is feeling more himself. He is going to get a list together of medications he " has used in the past in case we need to consider an alternative to Viibryd for cost or nightmare-type dreams. He had one last night but does not feel it was as severe as the previous one. 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 He is currently taking Viibryd, L-Methylfolate, and Xanax 0.5 mg as needed.  He denies any problems with sleep or appetite. He denies any SI/HI/AVH.    Current Medications:   Current Outpatient Medications   Medication Sig Dispense Refill   • ALPRAZolam (XANAX) 0.5 MG tablet Take 1 tablet by mouth At Night As Needed for Anxiety. 30 tablet 0   • l-methylfolate 15 MG tablet tablet Take 0.5 tablets by mouth Daily. 30 tablet 1   • latanoprost (XALATAN) 0.005 % ophthalmic solution Administer 1 drop to both eyes every night at bedtime.     • TESTOSTERONE COMPOUNDING KIT 20 % cream Place  on the skin as directed by provider.     • timolol (TIMOPTIC) 0.25 % ophthalmic solution INSTILL 1 DROP INTO RIGHT EYE ONCE A DAY     • Viibryd 10 MG tablet tablet Take 1 tablet by mouth Daily. 30 tablet 2     No current facility-administered medications for this visit.         Objective   Vital Signs:   There were no vitals taken for this visit.    Physical Exam  Nursing note reviewed. Vitals reviewed: No vitals due to nature of telehealth visit.   Constitutional:       Appearance: He is well-developed.   Neurological:      Mental Status: He is alert and oriented to person, place, and time.   Psychiatric:         Attention and Perception: Attention normal.         Mood and Affect: Mood is anxious.         Speech: Speech normal.         Behavior: Behavior normal. Behavior is cooperative.         Thought Content: Thought content normal.         Cognition and Memory: Cognition normal.         Judgment: Judgment normal.        Result Review :          Assessment and Plan    Problem List Items Addressed This Visit     None  Visit Diagnoses     Moderate recurrent major depression (HCC)  (Chronic)   -  Primary    Relevant Medications    Viibryd 10 MG tablet tablet    CHICO (generalized anxiety disorder)  (Chronic)       Relevant Medications    Viibryd 10 MG tablet tablet    Panic disorder  (Chronic)       Relevant Medications    Viibryd 10 MG tablet tablet          Mental Status Exam:   Hygiene:   Unable to assess  Cooperation:  Cooperative  Eye Contact:  Unable to assess  Psychomotor Behavior:  Unable to assess  Affect:  Unable to assess  Mood: anxious  Speech:  Normal  Thought Process:  Goal directed and Linear  Thought Content:  Normal  Suicidal:  None  Homicidal:  None  Hallucinations:  None  Delusion:  None  Memory:  Intact  Orientation:  Person, Place, Time and Situation  Reliability:  good  Insight:  Good  Judgement:  Good  Impulse Control:  Good  Physical/Medical Issues:  No      PHQ-9 Score:   PHQ-9 Total Score:       Impression/Plan:  -This is a follow up and medication check. Brady reports that he resumed Viibryd every other day as sugested by his holistic physician after he noticed his anxiety worsened when he stopped completely. He finished a taper while vacationing in [place] so he resumed the medication. He is also taking L-Methylfolate. He feels this combination has been affected but had one nightmare-type dream last night. While not as severe in his opinion he lost sleep. This could be a safety risk due to his type of job. So, we will continue to assess closely. He agrees to continue his regiment and meet back in two weeks to evaluate.   -Continue Viibryd 10 mg every other day for depression and anxiety.   -Continue L-Methylfolate 7.5 mg daily for depression. Patient has refills.   -Continue Xanax 0.5 mg as needed nightly for panic.  Patient has refills.  -The SILVESTRE report, reviewed through PDMP, of the past 12 months were reviewed and is appropriate.  The patient/guardian reports taking the medication only as  prescribed.  The patient/guardian denies any abuse or misuse of the medication.  The patient/guardian denies any other substance use or issues. There are no apparent substance related issues.  The patient reports no side effects of the current medication usage.  The patient/guardian has reported significant improvement with medication usage and wishes to continue medication as prescribed.  The patient/guardian is appropriate to continue with current medication usage at this time.  Reinforced risks and side effects of medication usage, patient and/or guardian verbalize understanding in their own words and are in agreement with current plan.  -Last urine drug screen June 2021.  Appropriate.  Orders have been placed for another urine drug screen for compliance.  I am expecting Brady to be positive for benzodiazepines and negative for all other illicit substances.  I will review the official results.    MEDS ORDERED DURING VISIT:  New Medications Ordered This Visit   Medications   • Viibryd 10 MG tablet tablet     Sig: Take 1 tablet by mouth Daily.     Dispense:  30 tablet     Refill:  2     Phone call began at 2:38 PM and ended at 2:53 PM    Follow Up   Return in about 2 weeks (around 2/27/2023) for Medication Check.  Patient was given instructions and counseling regarding his condition or for health maintenance advice. Please see specific information pulled into the AVS if appropriate.       TREATMENT PLAN/GOALS: Continue supportive psychotherapy efforts and medications as indicated. Treatment and medication options discussed during today's visit. Patient acknowledged and verbally consented to continue with current treatment plan and was educated on the importance of compliance with treatment and follow-up appointments.    MEDICATION ISSUES:  Discussed medication options and treatment plan of prescribed medication as well as the risks, benefits, and side effects including potential falls, possible impaired driving and  metabolic adversities among others. Patient is agreeable to call the office with any worsening of symptoms or onset of side effects. Patient is agreeable to call 911 or go to the nearest ER should he/she begin having SI/HI.        This document has been electronically signed by TOBI Mays, PMHNP-BC  February 13, 2023 18:07 EST    Part of this note may be an electronic transcription/translation of spoken language to printed text using the Dragon Dictation System.

## 2023-02-13 ENCOUNTER — OFFICE VISIT (OUTPATIENT)
Dept: PSYCHIATRY | Facility: CLINIC | Age: 63
End: 2023-02-13

## 2023-02-13 DIAGNOSIS — F41.0 PANIC DISORDER: Chronic | ICD-10-CM

## 2023-02-13 DIAGNOSIS — F41.1 GAD (GENERALIZED ANXIETY DISORDER): Chronic | ICD-10-CM

## 2023-02-13 DIAGNOSIS — F33.1 MODERATE RECURRENT MAJOR DEPRESSION: Primary | Chronic | ICD-10-CM

## 2023-02-13 PROCEDURE — 99214 OFFICE O/P EST MOD 30 MIN: CPT | Performed by: NURSE PRACTITIONER

## 2023-02-13 RX ORDER — VILAZODONE HYDROCHLORIDE 10 MG/1
10 TABLET ORAL DAILY
Qty: 30 TABLET | Refills: 2 | Status: SHIPPED | OUTPATIENT
Start: 2023-02-13 | End: 2023-02-27 | Stop reason: SDUPTHER

## 2023-02-24 DIAGNOSIS — F41.1 GAD (GENERALIZED ANXIETY DISORDER): Chronic | ICD-10-CM

## 2023-02-24 DIAGNOSIS — F33.1 MODERATE RECURRENT MAJOR DEPRESSION: Chronic | ICD-10-CM

## 2023-02-24 RX ORDER — VILAZODONE HYDROCHLORIDE 10 MG/1
TABLET ORAL
Qty: 30 TABLET | Refills: 0 | OUTPATIENT
Start: 2023-02-24

## 2023-02-25 DIAGNOSIS — F33.1 MODERATE RECURRENT MAJOR DEPRESSION: Chronic | ICD-10-CM

## 2023-02-25 DIAGNOSIS — F41.1 GAD (GENERALIZED ANXIETY DISORDER): Chronic | ICD-10-CM

## 2023-02-27 ENCOUNTER — TELEPHONE (OUTPATIENT)
Dept: PSYCHIATRY | Facility: CLINIC | Age: 63
End: 2023-02-27

## 2023-02-27 DIAGNOSIS — F41.1 GAD (GENERALIZED ANXIETY DISORDER): ICD-10-CM

## 2023-02-27 DIAGNOSIS — F33.1 MODERATE RECURRENT MAJOR DEPRESSION: Primary | ICD-10-CM

## 2023-02-27 DIAGNOSIS — F41.1 GAD (GENERALIZED ANXIETY DISORDER): Chronic | ICD-10-CM

## 2023-02-27 DIAGNOSIS — F33.1 MODERATE RECURRENT MAJOR DEPRESSION: Chronic | ICD-10-CM

## 2023-02-27 RX ORDER — VILAZODONE HYDROCHLORIDE 10 MG/1
10 TABLET ORAL DAILY
Qty: 30 TABLET | Refills: 2 | Status: SHIPPED | OUTPATIENT
Start: 2023-02-27 | End: 2023-03-07

## 2023-02-27 RX ORDER — VILAZODONE HYDROCHLORIDE 10 MG/1
10 TABLET ORAL DAILY
Qty: 30 TABLET | Refills: 2 | Status: SHIPPED | OUTPATIENT
Start: 2023-02-27 | End: 2023-02-27

## 2023-02-27 RX ORDER — VILAZODONE HYDROCHLORIDE 10 MG/1
TABLET ORAL
Qty: 30 TABLET | Refills: 0 | OUTPATIENT
Start: 2023-02-27

## 2023-02-27 NOTE — TELEPHONE ENCOUNTER
Pt needs viibryd sent to [facility] more affordable.   Rx Refill Note  Requested Prescriptions     Pending Prescriptions Disp Refills   • Viibryd 10 MG tablet tablet 30 tablet 2     Sig: Take 1 tablet by mouth Daily.      Last office visit with prescribing clinician: 2/13/2023   Last telemedicine visit with prescribing clinician: 3/7/2023   Next office visit with prescribing clinician: 3/7/2023                         Would you like a call back once the refill request has been completed: [] Yes [] No    If the office needs to give you a call back, can they leave a voicemail: [] Yes [] No    Mar Sevilla, GAUDENCIO  02/27/23, 11:39 EST

## 2023-02-27 NOTE — TELEPHONE ENCOUNTER
Brady needs the Vilazodone (Viibryd) sent in to [facility] as generic so that he can afford to . He has no insurance. If I need to call and cancel the brand, just let me know. Thank you.

## 2023-03-04 NOTE — PROGRESS NOTES
"This provider is located at [facility],  [address] [place] [address], [address],[facility], using a telephone in a secure private environment. Chief Complaint  Depression, anxiety, and panic      Subjective          Brady Tijerina presents to [facility] by himself or a follow up and medication check.    History of Present Illness: Brady states, \"some things are better but I am not where I want to be.\" Brady is still experiencing depression but feels Viibryd is improving anxiety. He is still having vivid dreams that are sometimes distressing. He reported having several days of thoughts of being better off dead or self-harm but denies and tells me he mis-read the chart. He will skip the medication if he did not sleep well the night prior.  He is currently taking Viibryd, L-Methylfolate, and Xanax 0.5 mg as needed.  He denies any problems with appetite and reports weight gain. He denies any SI/HI/AVH.    Current Medications:   Current Outpatient Medications   Medication Sig Dispense Refill   • ALPRAZolam (XANAX) 0.5 MG tablet Take 1 tablet by mouth At Night As Needed for Anxiety. 30 tablet 0   • l-methylfolate 15 MG tablet tablet Take 0.5 tablets by mouth Daily. 30 tablet 1   • latanoprost (XALATAN) 0.005 % ophthalmic solution Administer 1 drop to both eyes every night at bedtime.     • TESTOSTERONE COMPOUNDING KIT 20 % cream Place  on the skin as directed by provider.     • timolol (TIMOPTIC) 0.25 % ophthalmic solution INSTILL 1 DROP INTO RIGHT EYE ONCE A DAY     • mirtazapine (Remeron) 15 MG tablet Take 0.5 tablets by mouth Every Night for 14 days, THEN 1 tablet Every Night for 14 days. 21 tablet 0     No current facility-administered medications for this visit.         Objective   Vital Signs:   /88   Pulse 76   Ht 185.4 cm (73\")   Wt 113 kg (249 lb)   BMI 32.85 kg/m²     Physical Exam  Vitals and nursing note reviewed.   Constitutional:       Appearance: " Normal appearance. He is well-developed and normal weight.   Musculoskeletal:         General: Normal range of motion.   Skin:     General: Skin is warm and dry.   Neurological:      General: No focal deficit present.      Mental Status: He is alert and oriented to person, place, and time.   Psychiatric:         Attention and Perception: Attention normal.         Mood and Affect: Mood is depressed.         Speech: Speech normal.         Behavior: Behavior normal. Behavior is cooperative.         Thought Content: Thought content normal.         Cognition and Memory: Cognition normal.         Judgment: Judgment normal.        Result Review :          Assessment and Plan    Problem List Items Addressed This Visit    None  Visit Diagnoses     Moderate recurrent major depression (HCC)  (Chronic)   -  Primary    Relevant Medications    mirtazapine (Remeron) 15 MG tablet    CHICO (generalized anxiety disorder)  (Chronic)       Relevant Medications    mirtazapine (Remeron) 15 MG tablet    Panic disorder  (Chronic)       Relevant Medications    mirtazapine (Remeron) 15 MG tablet          Mental Status Exam:   Hygiene:   good  Cooperation:  Cooperative  Eye Contact:  Good  Psychomotor Behavior:  Appropriate  Affect:  Appropriate  Mood: depressed  Speech:  Normal  Thought Process:  Goal directed and Linear  Thought Content:  Normal  Suicidal:  None  Homicidal:  None  Hallucinations:  None  Delusion:  None  Memory:  Intact  Orientation:  Person, Place, Time and Situation  Reliability:  good  Insight:  Good  Judgement:  Good  Impulse Control:  Good  Physical/Medical Issues:  No      PHQ-9 Score:   PHQ-9 Total Score: 11     Impression/Plan:  -This is a follow up and medication check. Brady reports improved anxiety but no improvement in depression. He is trying to adjust to Viibryd and the effects it has on his sleep. He is unsure if this medication is working well for him; therefore, we will switch to Mirtazapine. I explained the  mechanism of action and purpose. I suggested tapering Viibyrd over the next few days and starting Mirtazapine on Monday. We discussed risks versus benefits including potential to worsen mood. We discussed adverse effects.   -Taper to stop Viibryd due to perceived lack of efficacy.  -Initiate Mirtazapine 7.5 mg nightly for two weeks and then 15 mg nightly for anxiety and depression.   -Continue L-Methylfolate 7.5 mg daily for depression. Patient has refills.   -Continue Xanax 0.5 mg as needed nightly for panic.  Patient has refills.  -The SILVESTRE report, reviewed through PDMP, of the past 12 months were reviewed and is appropriate.  The patient/guardian reports taking the medication only as prescribed.  The patient/guardian denies any abuse or misuse of the medication.  The patient/guardian denies any other substance use or issues. There are no apparent substance related issues.  The patient reports no side effects of the current medication usage.  The patient/guardian has reported significant improvement with medication usage and wishes to continue medication as prescribed.  The patient/guardian is appropriate to continue with current medication usage at this time.  Reinforced risks and side effects of medication usage, patient and/or guardian verbalize understanding in their own words and are in agreement with current plan.  -Last urine drug screen June 2021.  Appropriate.  Orders have been placed for another urine drug screen for compliance.  I am expecting Brady to be positive for benzodiazepines and negative for all other illicit substances.  I will review the official results.    MEDS ORDERED DURING VISIT:  New Medications Ordered This Visit   Medications   • mirtazapine (Remeron) 15 MG tablet     Sig: Take 0.5 tablets by mouth Every Night for 14 days, THEN 1 tablet Every Night for 14 days.     Dispense:  21 tablet     Refill:  0         Follow Up   Return in about 4 weeks (around 4/4/2023) for Medication  Check.  Patient was given instructions and counseling regarding his condition or for health maintenance advice. Please see specific information pulled into the AVS if appropriate.       TREATMENT PLAN/GOALS: Continue supportive psychotherapy efforts and medications as indicated. Treatment and medication options discussed during today's visit. Patient acknowledged and verbally consented to continue with current treatment plan and was educated on the importance of compliance with treatment and follow-up appointments.    MEDICATION ISSUES:  Discussed medication options and treatment plan of prescribed medication as well as the risks, benefits, and side effects including potential falls, possible impaired driving and metabolic adversities among others. Patient is agreeable to call the office with any worsening of symptoms or onset of side effects. Patient is agreeable to call 911 or go to the nearest ER should he/she begin having SI/HI.        This document has been electronically signed by TOBI Mays, PMHNP-BC  March 7, 2023 18:25 EST    Part of this note may be an electronic transcription/translation of spoken language to printed text using the Dragon Dictation System.

## 2023-03-07 ENCOUNTER — OFFICE VISIT (OUTPATIENT)
Dept: PSYCHIATRY | Facility: CLINIC | Age: 63
End: 2023-03-07

## 2023-03-07 VITALS
DIASTOLIC BLOOD PRESSURE: 88 MMHG | HEART RATE: 76 BPM | BODY MASS INDEX: 33 KG/M2 | SYSTOLIC BLOOD PRESSURE: 132 MMHG | HEIGHT: 73 IN | WEIGHT: 249 LBS

## 2023-03-07 DIAGNOSIS — F33.1 MODERATE RECURRENT MAJOR DEPRESSION: Primary | Chronic | ICD-10-CM

## 2023-03-07 DIAGNOSIS — F41.0 PANIC DISORDER: Chronic | ICD-10-CM

## 2023-03-07 DIAGNOSIS — F41.1 GAD (GENERALIZED ANXIETY DISORDER): Chronic | ICD-10-CM

## 2023-03-07 PROCEDURE — 99214 OFFICE O/P EST MOD 30 MIN: CPT | Performed by: NURSE PRACTITIONER

## 2023-03-07 RX ORDER — MIRTAZAPINE 15 MG/1
TABLET, FILM COATED ORAL
Qty: 21 TABLET | Refills: 0 | Status: SHIPPED | OUTPATIENT
Start: 2023-03-07 | End: 2023-03-27

## 2023-03-20 ENCOUNTER — TELEPHONE (OUTPATIENT)
Dept: PSYCHIATRY | Facility: CLINIC | Age: 63
End: 2023-03-20

## 2023-03-20 NOTE — TELEPHONE ENCOUNTER
Patient states that the new medication is causing more anxiety, hearts flipping and flopping at night, can't sleep.  Please advise

## 2023-03-21 NOTE — TELEPHONE ENCOUNTER
He is taking 7.5 mg Remeron he said he was having palpitations due to this medication his heart woke him up, he cursed me when I asked him if he had checked his heart rate with a monitor to ensure it was racing and not just anxiety. I calmly told him that with anxiety it can mimic making you feel like your heart was racing. He said he did not check it and that he needed something else that this was not going to work.

## 2023-03-27 ENCOUNTER — TELEPHONE (OUTPATIENT)
Dept: PSYCHIATRY | Facility: CLINIC | Age: 63
End: 2023-03-27

## 2023-03-27 DIAGNOSIS — F33.1 MODERATE RECURRENT MAJOR DEPRESSION: Primary | ICD-10-CM

## 2023-03-27 DIAGNOSIS — F41.1 GAD (GENERALIZED ANXIETY DISORDER): ICD-10-CM

## 2023-03-27 RX ORDER — DESVENLAFAXINE SUCCINATE 50 MG/1
50 TABLET, EXTENDED RELEASE ORAL DAILY
Qty: 30 TABLET | Refills: 2 | Status: SHIPPED | OUTPATIENT
Start: 2023-03-27

## 2023-03-27 NOTE — TELEPHONE ENCOUNTER
Called recently and stated that the mirtazapine was causing him to have heart palpitations, anxiety, and sleep disturbances. Discussed trying Celexa or Pristiq with pt. Pt would like to try Pristiq. Requests medication be sent to [facility].

## 2023-03-28 DIAGNOSIS — F41.0 PANIC DISORDER: Chronic | ICD-10-CM

## 2023-03-28 RX ORDER — ALPRAZOLAM 0.5 MG/1
0.5 TABLET ORAL NIGHTLY PRN
Qty: 30 TABLET | Refills: 0 | Status: SHIPPED | OUTPATIENT
Start: 2023-03-28

## 2023-03-28 NOTE — TELEPHONE ENCOUNTER
Chart review completed and medication refill approved.   Patient's SILVESTRE report reviewed and deemed appropriate.  Patient counseled on use of controlled substances.

## 2023-05-14 NOTE — PROGRESS NOTES
"This provider is located at [facility],  [address], [address],[facility], using a telephone in a secure private environment. Chief Complaint  Depression, anxiety, and panic      Subjective          Brady Tijerina presents to [facility] by himself or a follow up and medication check.    History of Present Illness: Brady states, \" I have been better.\"  Brady tells me that he is experiencing racing thoughts, feelings of hopelessness, increased anxiety, guilt, and shame.  He tells me that he has a constant state of dread and feels as if everybody is upset with him.  He knows that these thoughts are irrational but cannot stop worrying.  He describes it as constant.  He also notices some physical symptoms from depression.  He has aches and pains that come and go.  He realizes this is part of his depression as the pain is not constant.  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  He reports having passive suicidal ideations with the hopelessness but adamantly denies any intent or plan for suicide.  He discusses his father's side of the family and their mental health.  His paternal grandmother committed suicide and he notes that she experienced significant physical pain.  He continues to work with his homeopathic doctor, Dr. Bueno, who has him on several supplements like cysteine.  He is no longer taking L-methylfolate or Pristiq.  He reports taking Pristiq for a period of 2 weeks and is unsure why he stopped but feels it must not have helped him.  He reports having a sense of brain fog and is forgetting and losing things easily.  He is having difficulty with initiating sleep and feels constant fatigue.  He denies any trouble with appetite.  There is a 20 pound weight loss noted.  He is currently taking Xanax 0.5 mg as needed. He denies any SI/HI/AVH.    Current Medications:   Current Outpatient Medications   Medication Sig Dispense Refill   • ALPRAZolam (XANAX) 0.5 MG " "tablet TAKE 1 TABLET BY MOUTH AT NIGHT AS NEEDED FOR ANXIETY. 30 tablet 0   • latanoprost (XALATAN) 0.005 % ophthalmic solution Administer 1 drop to both eyes every night at bedtime.     • TESTOSTERONE COMPOUNDING KIT 20 % cream Place  on the skin as directed by provider.     • timolol (TIMOPTIC) 0.25 % ophthalmic solution INSTILL 1 DROP INTO RIGHT EYE ONCE A DAY     • escitalopram (Lexapro) 10 MG tablet Take 1 tablet by mouth Daily. 90 tablet 1     No current facility-administered medications for this visit.         Objective   Vital Signs:   /86   Pulse 80   Ht 185.4 cm (73\")   Wt 103 kg (228 lb)   BMI 30.08 kg/m²     Physical Exam  Vitals and nursing note reviewed.   Constitutional:       Appearance: Normal appearance. He is well-developed and normal weight.   Musculoskeletal:         General: Normal range of motion.   Skin:     General: Skin is warm and dry.   Neurological:      General: No focal deficit present.      Mental Status: He is alert and oriented to person, place, and time.   Psychiatric:         Attention and Perception: Attention normal.         Mood and Affect: Mood is depressed.         Speech: Speech normal.         Behavior: Behavior normal. Behavior is cooperative.         Thought Content: Thought content includes suicidal (Passive without intent or plan) ideation.         Cognition and Memory: Cognition normal.         Judgment: Judgment normal.        Result Review :          Assessment and Plan    Problem List Items Addressed This Visit    None  Visit Diagnoses     Moderate episode of recurrent major depressive disorder  (Chronic)   -  Primary    Relevant Medications    escitalopram (Lexapro) 10 MG tablet    Medication management        Relevant Orders    Compliance Drug Analysis, Ur - Urine, Clean Catch    Generalized anxiety disorder  (Chronic)       Relevant Medications    escitalopram (Lexapro) 10 MG tablet    Panic disorder  (Chronic)       Relevant Medications    escitalopram " (Lexapro) 10 MG tablet          Mental Status Exam:   Hygiene:   good  Cooperation:  Cooperative  Eye Contact:  Good  Psychomotor Behavior:  Appropriate  Affect:  Appropriate  Mood: depressed  Speech:  Normal  Thought Process:  Goal directed and Linear  Thought Content:  Normal  Suicidal:  Suicidal Ideation and Passive without intent or plan  Homicidal:  None  Hallucinations:  None  Delusion:  None  Memory:  Intact  Orientation:  Person, Place, Time and Situation  Reliability:  good  Insight:  Good  Judgement:  Good  Impulse Control:  Good  Physical/Medical Issues:  Yes New onset of aches and pains     PHQ-9 Score:   PHQ-9 Total Score: 15     Impression/Plan:  -This is a follow up and medication check. Brady reports having moderate symptoms of depression and anxiety.  He reports feeling hopeless, guilty, shame, and has racing thoughts of constant worry.  He adamantly denies any intent or plan for suicide and reports his ideations as being passive with hopelessness.  He discusses his family history of mental health.  He remains motivated to find an active treatment.  He feels resuming Lexapro may be the most beneficial option for him at this time since the most recent medications: Viibryd, mirtazapine, and Pristiq have not been beneficial or lead to side effects.  He still has ongoing concerns for sexual side effects related to Lexapro and fears increasing to higher doses.  We discussed possibly adding Cymbalta to the Lexapro for benefits on depression, anxiety, and physical symptoms of pain.  He is going to consider this option.  He agrees to signing a new controlled substance agreement and submitting a UDS for compliance.  We will meet back in 4 weeks to assess.  -Stop Pristiq and L-Methylfolate due to perceived lack of efficacy.  -Resume Lexapro 10 mg nightly for depression and anxiety.  -Continue Xanax 0.5 mg as needed nightly for panic.  Patient has refills.  -The SILVESTRE report, reviewed through PDMP, of the  past 12 months were reviewed and is appropriate.  The patient/guardian reports taking the medication only as prescribed.  The patient/guardian denies any abuse or misuse of the medication.  The patient/guardian denies any other substance use or issues. There are no apparent substance related issues.  The patient reports no side effects of the current medication usage.  The patient/guardian has reported significant improvement with medication usage and wishes to continue medication as prescribed.  The patient/guardian is appropriate to continue with current medication usage at this time.  Reinforced risks and side effects of medication usage, patient and/or guardian verbalize understanding in their own words and are in agreement with current plan.  -Last urine drug screen June 2021.  Appropriate.  I placed orders for a urine drug screen.  -The patient has read and signed the [facility] Controlled Substance Contract. We discussed the risks and benefits of the use of controlled substances, including the risk of tolerance and drug dependence. Anorectic medications can be prescribed by one provider at a time and dispensed from one facility at a time, they can only be taken as prescribed, and we are not obligated to refill them if lost or stolen. The refills are only during regular clinic hours. Nicolás report was pulled on patient, reviewed and found to be appropriate.       MEDS ORDERED DURING VISIT:  New Medications Ordered This Visit   Medications   • escitalopram (Lexapro) 10 MG tablet     Sig: Take 1 tablet by mouth Daily.     Dispense:  90 tablet     Refill:  1         Follow Up   Return in about 4 weeks (around 6/15/2023) for Medication Check.  Patient was given instructions and counseling regarding his condition or for health maintenance advice. Please see specific information pulled into the AVS if appropriate.       TREATMENT PLAN/GOALS: Continue supportive psychotherapy efforts and medications as indicated.  Treatment and medication options discussed during today's visit. Patient acknowledged and verbally consented to continue with current treatment plan and was educated on the importance of compliance with treatment and follow-up appointments.    MEDICATION ISSUES:  Discussed medication options and treatment plan of prescribed medication as well as the risks, benefits, and side effects including potential falls, possible impaired driving and metabolic adversities among others. Patient is agreeable to call the office with any worsening of symptoms or onset of side effects. Patient is agreeable to call 911 or go to the nearest ER should he/she begin having SI/HI.        This document has been electronically signed by TOBI Mays, PMHNP-BC  May 18, 2023 09:00 EDT    Part of this note may be an electronic transcription/translation of spoken language to printed text using the Dragon Dictation System.

## 2023-05-18 ENCOUNTER — OFFICE VISIT (OUTPATIENT)
Dept: PSYCHIATRY | Facility: CLINIC | Age: 63
End: 2023-05-18
Payer: MEDICARE

## 2023-05-18 VITALS
SYSTOLIC BLOOD PRESSURE: 128 MMHG | HEART RATE: 80 BPM | WEIGHT: 228 LBS | DIASTOLIC BLOOD PRESSURE: 86 MMHG | BODY MASS INDEX: 30.22 KG/M2 | HEIGHT: 73 IN

## 2023-05-18 DIAGNOSIS — F41.0 PANIC DISORDER: Chronic | ICD-10-CM

## 2023-05-18 DIAGNOSIS — F41.1 GENERALIZED ANXIETY DISORDER: Chronic | ICD-10-CM

## 2023-05-18 DIAGNOSIS — F33.1 MODERATE EPISODE OF RECURRENT MAJOR DEPRESSIVE DISORDER: Primary | Chronic | ICD-10-CM

## 2023-05-18 DIAGNOSIS — Z79.899 MEDICATION MANAGEMENT: ICD-10-CM

## 2023-05-18 PROCEDURE — 99214 OFFICE O/P EST MOD 30 MIN: CPT | Performed by: NURSE PRACTITIONER

## 2023-05-18 RX ORDER — ESCITALOPRAM OXALATE 10 MG/1
10 TABLET ORAL DAILY
Qty: 90 TABLET | Refills: 1 | Status: SHIPPED | OUTPATIENT
Start: 2023-05-18 | End: 2024-05-17

## 2023-05-25 LAB — DRUGS UR: NORMAL

## 2023-05-31 DIAGNOSIS — F41.0 PANIC DISORDER: Chronic | ICD-10-CM

## 2023-05-31 RX ORDER — ALPRAZOLAM 0.5 MG/1
0.5 TABLET ORAL NIGHTLY PRN
Qty: 30 TABLET | Refills: 0 | Status: SHIPPED | OUTPATIENT
Start: 2023-05-31

## 2023-05-31 NOTE — TELEPHONE ENCOUNTER
Rx Refill Note  Requested Prescriptions     Pending Prescriptions Disp Refills   • ALPRAZolam (XANAX) 0.5 MG tablet [Pharmacy Med Name: *ALPRAZOLAM 0.5MG TAB] 30 tablet 0     Sig: TAKE 1 TABLET BY MOUTH AT NIGHT AS NEEDED FOR ANXIETY.      Last office visit with prescribing clinician: Visit date not found      Next office visit with prescribing clinician: Visit date not found            Carlie Mac MA  05/31/23, 15:52 EDT

## 2023-09-19 DIAGNOSIS — F41.0 PANIC DISORDER: Chronic | ICD-10-CM

## 2023-09-19 RX ORDER — ALPRAZOLAM 0.5 MG/1
0.5 TABLET ORAL NIGHTLY PRN
Qty: 30 TABLET | Refills: 0 | OUTPATIENT
Start: 2023-09-19

## 2023-10-29 NOTE — PROGRESS NOTES
This provider is located at [facility],  [facility], [address],[facility], using a telephone in a secure private environment. Chief Complaint  Depression, anxiety, and panic      Subjective          Brady Tijerina presents to [facility] by himself or a follow up and medication check.    History of Present Illness: Brady presents with an elevated blood pressure of 182/98.  He denies any problems with headache, blurred vision, or dizziness.  He tells me this is the first time his blood pressure has been elevated.  We reviewed possible causes and he does note that he had tried baking soda for heartburn recently which may have increased his sodium levels.  He often has acid reflux which is diet related for him.  He feels slightly forgetful this morning but attributes it to his sleep.  He had some swelling in the right ankle yesterday but notes a injury of the right hip.  He did text his primary care provider to inform them that his blood pressure was elevated.  Upon a recheck his blood pressure was 168/94. He tells me that his mood has been more stable since resuming Lexapro.  He continues to have anxiety at times which is related to his work and feeling overwhelmed.  He tells me he has tried to adopt a new philosophy of taking it in strides.  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  He has been working on improving his mood through daily affirmations and gratitude, as well as medication.  He notes there is a genetic component to his depressive symptoms so he understands he may always need medications to manage mood.  He is sleeping well but continues to wake at night to use the restroom.  He denies any problems with appetite but notes that he often does eat more than he should or eats too many sweets.  Twin his primary care and homeopathic provider, he has frequent lab assessments. He is currently  "taking Lexapro 10 mg nightly and Xanax 0.5 mg as needed.  He denies any side effects.  He tells me has been taking more Xanax recently due to needing dental procedures.  He denies any SI/HI/AVH.    Current Medications:   Current Outpatient Medications   Medication Sig Dispense Refill    ALPRAZolam (XANAX) 0.5 MG tablet Take 1 tablet by mouth At Night As Needed for Anxiety. 30 tablet 0    escitalopram (Lexapro) 10 MG tablet Take 1 tablet by mouth Daily. 90 tablet 1    TESTOSTERONE COMPOUNDING KIT 20 % cream Place  on the skin as directed by provider.      latanoprost (XALATAN) 0.005 % ophthalmic solution Administer 1 drop to both eyes every night at bedtime. (Patient not taking: Reported on 10/31/2023)      timolol (TIMOPTIC) 0.25 % ophthalmic solution INSTILL 1 DROP INTO RIGHT EYE ONCE A DAY (Patient not taking: Reported on 10/31/2023)       No current facility-administered medications for this visit.         Objective   Vital Signs:   BP (!) 182/98 Comment: Second attempt 168/94 L arm  Pulse 74   Ht 185.4 cm (73\")   Wt 109 kg (240 lb)   SpO2 98%   BMI 31.66 kg/m²     Physical Exam  Vitals and nursing note reviewed.   Constitutional:       Appearance: Normal appearance. He is well-developed and normal weight.   Musculoskeletal:         General: Normal range of motion.   Skin:     General: Skin is warm and dry.   Neurological:      General: No focal deficit present.      Mental Status: He is alert and oriented to person, place, and time.   Psychiatric:         Attention and Perception: Attention normal.         Mood and Affect: Mood and affect normal.         Speech: Speech normal.         Behavior: Behavior normal. Behavior is cooperative.         Thought Content: Thought content normal.         Cognition and Memory: Cognition normal.         Judgment: Judgment normal.        Result Review :          Assessment and Plan    Problem List Items Addressed This Visit    None  Visit Diagnoses       Current mild " episode of major depressive disorder, unspecified whether recurrent  (Chronic)   -  Primary    Relevant Medications    escitalopram (Lexapro) 10 MG tablet    ALPRAZolam (XANAX) 0.5 MG tablet    Generalized anxiety disorder  (Chronic)       Relevant Medications    escitalopram (Lexapro) 10 MG tablet    ALPRAZolam (XANAX) 0.5 MG tablet    Panic disorder  (Chronic)       Relevant Medications    escitalopram (Lexapro) 10 MG tablet    ALPRAZolam (XANAX) 0.5 MG tablet              Mental Status Exam:   Hygiene:   good  Cooperation:  Cooperative  Eye Contact:  Good  Psychomotor Behavior:  Appropriate  Affect:  Appropriate  Mood: normal  Speech:  Normal  Thought Process:  Goal directed and Linear  Thought Content:  Normal  Suicidal:  None  Homicidal:  None  Hallucinations:  None  Delusion:  None  Memory:  Intact  Orientation:  Person, Place, Time and Situation  Reliability:  good  Insight:  Good  Judgement:  Good  Impulse Control:  Good  Physical/Medical Issues:  Yes New onset of aches and pains, elevated blood pressure in office today      PHQ-9 Score:   PHQ-9 Total Score: 6     Impression/Plan:  -This is a follow up and medication check. Brady reports improved mood and anxiety since resuming Lexapro.  He had several months of trying new medications to resolve his depressive symptoms but was unable to tolerate them.  However, now Lexapro is working well for him.  He is sleeping and eating well.  He continues to visit his primary and homeopathic doctor.  He had an elevated blood pressure upon arrival today but denies any signs of hypertension like headache, dizziness, or blurred vision.  He notes 2 symptoms such as being forgetful this morning and swelling in his right ankle that are concerning.  He did text his primary care to let them know that his blood pressure was elevated.  Upon recheck, the level did come down slightly but is still higher than normal.  He will contact his primary care to discuss.  We also discussed  possibly watching diet and sodium intake as well.  He is pleased with his current medication regimen and would like to continue at current doses.  -Continue Lexapro 10 mg nightly for depression and anxiety.  -Continue Xanax 0.5 mg as needed nightly for panic.   -The SILVESTRE report, reviewed through PDMP, of the past 12 months were reviewed and is appropriate.  The patient/guardian reports taking the medication only as prescribed.  The patient/guardian denies any abuse or misuse of the medication.  The patient/guardian denies any other substance use or issues. There are no apparent substance related issues.  The patient reports no side effects of the current medication usage.  The patient/guardian has reported significant improvement with medication usage and wishes to continue medication as prescribed.  The patient/guardian is appropriate to continue with current medication usage at this time.  Reinforced risks and side effects of medication usage, patient and/or guardian verbalize understanding in their own words and are in agreement with current plan.  -Last urine drug screen 05/18/2023.  Appropriate.          MEDS ORDERED DURING VISIT:  New Medications Ordered This Visit   Medications    escitalopram (Lexapro) 10 MG tablet     Sig: Take 1 tablet by mouth Daily.     Dispense:  90 tablet     Refill:  1    ALPRAZolam (XANAX) 0.5 MG tablet     Sig: Take 1 tablet by mouth At Night As Needed for Anxiety.     Dispense:  30 tablet     Refill:  0     05/31/2023 3:33:52 PM         Follow Up   Return in about 6 months (around 4/30/2024) for Medication Check, Labs with nex visit.  Patient was given instructions and counseling regarding his condition or for health maintenance advice. Please see specific information pulled into the AVS if appropriate.       TREATMENT PLAN/GOALS: Continue supportive psychotherapy efforts and medications as indicated. Treatment and medication options discussed during today's visit. Patient  acknowledged and verbally consented to continue with current treatment plan and was educated on the importance of compliance with treatment and follow-up appointments.    MEDICATION ISSUES:  Discussed medication options and treatment plan of prescribed medication as well as the risks, benefits, and side effects including potential falls, possible impaired driving and metabolic adversities among others. Patient is agreeable to call the office with any worsening of symptoms or onset of side effects. Patient is agreeable to call 911 or go to the nearest ER should he/she begin having SI/HI.        This document has been electronically signed by TOBI Mays, PMHNP-BC  October 31, 2023 11:27 EDT    Part of this note may be an electronic transcription/translation of spoken language to printed text using the Dragon Dictation System.

## 2023-10-31 ENCOUNTER — OFFICE VISIT (OUTPATIENT)
Dept: PSYCHIATRY | Facility: CLINIC | Age: 63
End: 2023-10-31
Payer: MEDICARE

## 2023-10-31 VITALS
OXYGEN SATURATION: 98 % | BODY MASS INDEX: 31.81 KG/M2 | DIASTOLIC BLOOD PRESSURE: 98 MMHG | SYSTOLIC BLOOD PRESSURE: 182 MMHG | HEIGHT: 73 IN | WEIGHT: 240 LBS | HEART RATE: 74 BPM

## 2023-10-31 DIAGNOSIS — F32.0 CURRENT MILD EPISODE OF MAJOR DEPRESSIVE DISORDER, UNSPECIFIED WHETHER RECURRENT: Primary | Chronic | ICD-10-CM

## 2023-10-31 DIAGNOSIS — F41.0 PANIC DISORDER: Chronic | ICD-10-CM

## 2023-10-31 DIAGNOSIS — F41.1 GENERALIZED ANXIETY DISORDER: Chronic | ICD-10-CM

## 2023-10-31 PROCEDURE — 1159F MED LIST DOCD IN RCRD: CPT | Performed by: NURSE PRACTITIONER

## 2023-10-31 PROCEDURE — 99214 OFFICE O/P EST MOD 30 MIN: CPT | Performed by: NURSE PRACTITIONER

## 2023-10-31 PROCEDURE — 1160F RVW MEDS BY RX/DR IN RCRD: CPT | Performed by: NURSE PRACTITIONER

## 2023-10-31 RX ORDER — ALPRAZOLAM 0.5 MG/1
0.5 TABLET ORAL NIGHTLY PRN
Qty: 30 TABLET | Refills: 0 | Status: SHIPPED | OUTPATIENT
Start: 2023-10-31

## 2023-10-31 RX ORDER — ESCITALOPRAM OXALATE 10 MG/1
10 TABLET ORAL DAILY
Qty: 90 TABLET | Refills: 1 | Status: SHIPPED | OUTPATIENT
Start: 2023-10-31 | End: 2024-10-30

## 2024-02-21 DIAGNOSIS — F41.0 PANIC DISORDER: Chronic | ICD-10-CM

## 2024-02-21 RX ORDER — ALPRAZOLAM 0.5 MG/1
0.5 TABLET ORAL NIGHTLY PRN
Qty: 30 TABLET | Refills: 0 | Status: SHIPPED | OUTPATIENT
Start: 2024-02-21

## 2024-02-21 NOTE — TELEPHONE ENCOUNTER
Rx Refill Note  Requested Prescriptions     Pending Prescriptions Disp Refills    ALPRAZolam (XANAX) 0.5 MG tablet 30 tablet 0     Sig: Take 1 tablet by mouth At Night As Needed for Anxiety.      Last office visit with prescribing clinician: 10/31/2023   Last telemedicine visit with prescribing clinician: Visit date not found   Next office visit with prescribing clinician: 4/30/2024                         Would you like a call back once the refill request has been completed: [] Yes [] No    If the office needs to give you a call back, can they leave a voicemail: [] Yes [] No    Mar Sevilla CMA  02/21/24, 09:06 EST

## 2024-04-23 NOTE — PROGRESS NOTES
"Chief Complaint  Depression, anxiety, and panic      Subjective          Brady Tijerina presents to [facility] by himself or a follow up and medication check.    History of Present Illness: Brady states, \"I am doing pretty good.\" Brady tells me that resuming Lexapro has helped his mood and anxiety, but there are times it can be worse. He has been listening to positive affirmations and reading self-help books to improve mood and anxiety too and finds these strategies helpful, but also has an understanding of the genetic and chemical components of anxiety and depression. He continues to work. 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 He had a recent fall on the trailer and injured his right shoulder after slipping on spilled oil. He sought evaluation and treatment, but reports needing several days to recover. He is sleeping and eating well.  He identifies concerns for weight gain. He is currently taking Lexapro 10 mg nightly and Xanax 0.5 mg as needed.  He denies any side effects. He denies any SI/HI/AVH.    Current Medications:   Current Outpatient Medications   Medication Sig Dispense Refill    ALPRAZolam (XANAX) 0.5 MG tablet Take 1 tablet by mouth At Night As Needed for Anxiety. 30 tablet 0    escitalopram (Lexapro) 10 MG tablet Take 1 tablet by mouth Daily. 90 tablet 1    TESTOSTERONE COMPOUNDING KIT 20 % cream Place  on the skin as directed by provider.       No current facility-administered medications for this visit.         Objective   Vital Signs:   /86   Pulse 86   Ht 185.4 cm (73\")   Wt 113 kg (248 lb 12.8 oz)   SpO2 98%   BMI 32.83 kg/m²     Physical Exam  Vitals and nursing note reviewed.   Constitutional:       Appearance: Normal appearance. He is well-developed. He is obese.   Musculoskeletal:         General: Normal range of motion.   Skin:     General: Skin is warm and dry.   Neurological:      General: No " focal deficit present.      Mental Status: He is alert and oriented to person, place, and time.   Psychiatric:         Attention and Perception: Attention normal.         Mood and Affect: Mood and affect normal.         Speech: Speech normal.         Behavior: Behavior normal. Behavior is cooperative.         Thought Content: Thought content normal.         Cognition and Memory: Cognition normal.         Judgment: Judgment normal.        Result Review :          Assessment and Plan    Problem List Items Addressed This Visit    None  Visit Diagnoses       CHICO (generalized anxiety disorder)  (Chronic)   -  Primary    Relevant Medications    escitalopram (Lexapro) 10 MG tablet    Panic disorder  (Chronic)       Relevant Medications    escitalopram (Lexapro) 10 MG tablet    Other Relevant Orders    Compliance Drug Analysis, Ur - Urine, Clean Catch    Medication management        Relevant Orders    Compliance Drug Analysis, Ur - Urine, Clean Catch    Current mild episode of major depressive disorder, unspecified whether recurrent  (Chronic)       Relevant Medications    escitalopram (Lexapro) 10 MG tablet    Generalized anxiety disorder  (Chronic)       Relevant Medications    escitalopram (Lexapro) 10 MG tablet                Mental Status Exam:   Hygiene:   good  Cooperation:  Cooperative  Eye Contact:  Good  Psychomotor Behavior:  Appropriate  Affect:  Appropriate  Mood: normal  Speech:  Normal  Thought Process:  Goal directed and Linear  Thought Content:  Normal  Suicidal:  None  Homicidal:  None  Hallucinations:  None  Delusion:  None  Memory:  Intact  Orientation:  Person, Place, Time and Situation  Reliability:  good  Insight:  Good  Judgement:  Good  Impulse Control:  Good  Physical/Medical Issues:  Yes New onset of aches and pains, elevated blood pressure, Trichiasis of both lower eyelids, and recent fall with right shoulder injury      PHQ-9 Score:   PHQ-9 Total Score: 6     Impression/Plan:  -This is a follow  up and medication check. Brady reports a stable mood. He has anxiety at times. He uses Xanax cautiously when needed, but also identifies times in the evening or while working when he could use an as needed option that is not sedating or causes dependency/addiction. We discussed possible options including Propranolol and Buspar. I explained the mechanism of action, purpose, risks, benefits, and potential adverse effects of both. He believes he may have a medication like Propranolol at home prescribed by his PCP for similar situations, so he is going to look at home and update provider. For now, he is pleased with medication and would like at continue at current doses.   -Continue Lexapro 10 mg nightly for depression and anxiety.  -Continue Xanax 0.5 mg as needed nightly for panic. Patient has refills.  -The SILVESTRE report, reviewed through PDMP, of the past 12 months were reviewed and is appropriate.  The patient/guardian reports taking the medication only as prescribed.  The patient/guardian denies any abuse or misuse of the medication.  The patient/guardian denies any other substance use or issues. There are no apparent substance related issues.  The patient reports no side effects of the current medication usage.  The patient/guardian has reported significant improvement with medication usage and wishes to continue medication as prescribed.  The patient/guardian is appropriate to continue with current medication usage at this time.  Reinforced risks and side effects of medication usage, patient and/or guardian verbalize understanding in their own words and are in agreement with current plan.  -Last urine drug screen 05/18/2023.  Appropriate.  I will complete a UDS for compliance.        MEDS ORDERED DURING VISIT:  New Medications Ordered This Visit   Medications    escitalopram (Lexapro) 10 MG tablet     Sig: Take 1 tablet by mouth Daily.     Dispense:  90 tablet     Refill:  1         Follow Up   Return in about 6  months (around 10/30/2024) for Medication Check.  Patient was given instructions and counseling regarding his condition or for health maintenance advice. Please see specific information pulled into the AVS if appropriate.       TREATMENT PLAN/GOALS: Continue supportive psychotherapy efforts and medications as indicated. Treatment and medication options discussed during today's visit. Patient acknowledged and verbally consented to continue with current treatment plan and was educated on the importance of compliance with treatment and follow-up appointments.    MEDICATION ISSUES:  Discussed medication options and treatment plan of prescribed medication as well as the risks, benefits, and side effects including potential falls, possible impaired driving and metabolic adversities among others. Patient is agreeable to call the office with any worsening of symptoms or onset of side effects. Patient is agreeable to call 911 or go to the nearest ER should he/she begin having SI/HI.        This document has been electronically signed by TOBI Mays, PMHNP-BC  April 30, 2024 12:29 EDT    Part of this note may be an electronic transcription/translation of spoken language to printed text using the Dragon Dictation System.

## 2024-04-30 ENCOUNTER — OFFICE VISIT (OUTPATIENT)
Dept: PSYCHIATRY | Facility: CLINIC | Age: 64
End: 2024-04-30
Payer: MEDICARE

## 2024-04-30 VITALS
WEIGHT: 248.8 LBS | HEIGHT: 73 IN | SYSTOLIC BLOOD PRESSURE: 134 MMHG | OXYGEN SATURATION: 98 % | BODY MASS INDEX: 32.97 KG/M2 | DIASTOLIC BLOOD PRESSURE: 86 MMHG | HEART RATE: 86 BPM

## 2024-04-30 DIAGNOSIS — F41.1 GENERALIZED ANXIETY DISORDER: Chronic | ICD-10-CM

## 2024-04-30 DIAGNOSIS — Z79.899 MEDICATION MANAGEMENT: ICD-10-CM

## 2024-04-30 DIAGNOSIS — F32.0 CURRENT MILD EPISODE OF MAJOR DEPRESSIVE DISORDER, UNSPECIFIED WHETHER RECURRENT: Chronic | ICD-10-CM

## 2024-04-30 DIAGNOSIS — F41.0 PANIC DISORDER: Chronic | ICD-10-CM

## 2024-04-30 DIAGNOSIS — F41.1 GAD (GENERALIZED ANXIETY DISORDER): Primary | Chronic | ICD-10-CM

## 2024-04-30 PROCEDURE — 1159F MED LIST DOCD IN RCRD: CPT | Performed by: NURSE PRACTITIONER

## 2024-04-30 PROCEDURE — 99214 OFFICE O/P EST MOD 30 MIN: CPT | Performed by: NURSE PRACTITIONER

## 2024-04-30 PROCEDURE — 1160F RVW MEDS BY RX/DR IN RCRD: CPT | Performed by: NURSE PRACTITIONER

## 2024-04-30 RX ORDER — ESCITALOPRAM OXALATE 10 MG/1
10 TABLET ORAL DAILY
Qty: 90 TABLET | Refills: 1 | Status: SHIPPED | OUTPATIENT
Start: 2024-04-30 | End: 2025-04-30

## 2024-04-30 NOTE — TELEPHONE ENCOUNTER
Has he tried Celexa or Pristiq? Kimberly Stapleton  [facility]  HEARTVASC [address]  Scheduled Appointment: 05/02/2024    [facility]  HEARTVASC [address]  Scheduled Appointment: 05/02/2024    Jarret Gallardo  [facility]  INFDISEASE [address]  Scheduled Appointment: 05/13/2024    Isidoro Alexandre  [facility]  PULMMED [address]  Scheduled Appointment: 05/13/2024    Efrain Castillo  [facility]  NEPHRO [address]  Scheduled Appointment: 06/13/2024    [place]  [facility]  INTMED [address]  Scheduled Appointment: 06/24/2024

## 2024-05-08 LAB — DRUGS UR: NORMAL

## 2024-06-07 DIAGNOSIS — F41.0 PANIC DISORDER: Chronic | ICD-10-CM

## 2024-06-10 RX ORDER — ALPRAZOLAM 0.5 MG/1
0.5 TABLET ORAL NIGHTLY PRN
Qty: 30 TABLET | Refills: 0 | Status: SHIPPED | OUTPATIENT
Start: 2024-06-10

## 2024-10-04 DIAGNOSIS — F41.0 PANIC DISORDER: Chronic | ICD-10-CM

## 2024-10-07 RX ORDER — ALPRAZOLAM 0.5 MG
0.5 TABLET ORAL NIGHTLY PRN
Qty: 30 TABLET | Refills: 0 | Status: SHIPPED | OUTPATIENT
Start: 2024-10-07

## 2024-10-28 NOTE — PROGRESS NOTES
"Chief Complaint  Depression, anxiety, and panic      Subjective          Brady Tijerina presents to [facility] by himself or a follow up and medication check.    History of Present Illness: Brady states, \"I have been busy.\" Brady tells me he was recently injured on the job. He described the even and shows me a large abrasion and bruising on the left side of his lower abdomen. He reports having an evaluation and no concerns for internal bleeding. He also notes an injury to his mouth and his left hand. 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 He reports a stable mood and managed anxiety. He has been working on positive affirmations and reframing negative thought patterns with his therapist. He is sleeping and eating well.  He is currently taking Lexapro 10 mg nightly and Xanax 0.5 mg as needed.  He denies any side effects. He denies any SI/HI/AVH.    Current Medications:   Current Outpatient Medications   Medication Sig Dispense Refill    ALPRAZolam (XANAX) 0.5 MG tablet TAKE 1 TABLET BY MOUTH AT NIGHT AS NEEDED FOR ANXIETY. 30 tablet 0    escitalopram (Lexapro) 10 MG tablet Take 1 tablet by mouth Daily. 90 tablet 1    TESTOSTERONE COMPOUNDING KIT 20 % cream Place  on the skin as directed by provider.       No current facility-administered medications for this visit.         Objective   Vital Signs:   /96   Pulse 82   Ht 185.4 cm (73\")   Wt 111 kg (244 lb)   SpO2 98%   BMI 32.19 kg/m²     Physical Exam  Vitals and nursing note reviewed.   Constitutional:       Appearance: Normal appearance. He is well-developed. He is obese.   Musculoskeletal:         General: Signs of injury (left lower abdomen) present. Normal range of motion.   Skin:     General: Skin is warm and dry.   Neurological:      General: No focal deficit present.      Mental Status: He is alert and oriented to " person, place, and time.   Psychiatric:         Attention and Perception: Attention normal.         Mood and Affect: Mood and affect normal.         Speech: Speech normal.         Behavior: Behavior normal. Behavior is cooperative.         Thought Content: Thought content normal.         Cognition and Memory: Cognition normal.         Judgment: Judgment normal.        Result Review :          Assessment and Plan    Problem List Items Addressed This Visit    None  Visit Diagnoses       Generalized anxiety disorder  (Chronic)   -  Primary    Relevant Medications    escitalopram (Lexapro) 10 MG tablet    Panic disorder  (Chronic)       Relevant Medications    escitalopram (Lexapro) 10 MG tablet    Current mild episode of major depressive disorder, unspecified whether recurrent  (Chronic)       Relevant Medications    escitalopram (Lexapro) 10 MG tablet                  Mental Status Exam:   Hygiene:   good  Cooperation:  Cooperative  Eye Contact:  Good  Psychomotor Behavior:  Appropriate  Affect:  Appropriate  Mood: normal  Speech:  Normal  Thought Process:  Goal directed and Linear  Thought Content:  Normal  Suicidal:  None  Homicidal:  None  Hallucinations:  None  Delusion:  None  Memory:  Intact  Orientation:  Person, Place, Time and Situation  Reliability:  good  Insight:  Good  Judgement:  Good  Impulse Control:  Good  Physical/Medical Issues:  Yes New onset of aches and pains, elevated blood pressure, Trichiasis of both lower eyelids, and recent fall with right shoulder injury      PHQ-9 Score:   PHQ-9 Total Score:       Impression/Plan:  -This is a follow up and medication check. Brady reports a stable mood and managed anxiety.  He continues to work on affirmations and reframing negative thought patterns with his therapist.  He was recently injured on the job and believes he is recovering well.  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  He reports sleeping and eating  well.  At this time, he is pleased with his current medication regiment.  We discussed the change in provider schedule and both agreed to see new provider, Jennifer Fuentes.  -Continue Lexapro 10 mg nightly for depression and anxiety.  -Continue Xanax 0.5 mg as needed nightly for panic. Patient has refills.  -The SILVESTRE report, reviewed through PDMP, of the past 12 months were reviewed and is appropriate.  The patient/guardian reports taking the medication only as prescribed.  The patient/guardian denies any abuse or misuse of the medication.  The patient/guardian denies any other substance use or issues. There are no apparent substance related issues.  The patient reports no side effects of the current medication usage.  The patient/guardian has reported significant improvement with medication usage and wishes to continue medication as prescribed.  The patient/guardian is appropriate to continue with current medication usage at this time.  Reinforced risks and side effects of medication usage, patient and/or guardian verbalize understanding in their own words and are in agreement with current plan.  -Last urine drug screen 04/30/2024.  Appropriate.  I will complete a UDS for compliance.        MEDS ORDERED DURING VISIT:  New Medications Ordered This Visit   Medications    escitalopram (Lexapro) 10 MG tablet     Sig: Take 1 tablet by mouth Daily.     Dispense:  90 tablet     Refill:  1         Follow Up   Return in about 6 months (around 4/30/2025) for Labs with nex visit, Medication Check.  Patient was given instructions and counseling regarding his condition or for health maintenance advice. Please see specific information pulled into the AVS if appropriate.       TREATMENT PLAN/GOALS: Continue supportive psychotherapy efforts and medications as indicated. Treatment and medication options discussed during today's visit. Patient acknowledged and verbally consented to continue with current treatment plan and  was educated on the importance of compliance with treatment and follow-up appointments.    MEDICATION ISSUES:  Discussed medication options and treatment plan of prescribed medication as well as the risks, benefits, and side effects including potential falls, possible impaired driving and metabolic adversities among others. Patient is agreeable to call the office with any worsening of symptoms or onset of side effects. Patient is agreeable to call 911 or go to the nearest ER should he/she begin having SI/HI.        This document has been electronically signed by TOBI Mays, PMHNP-BC  October 30, 2024 13:27 EDT    Part of this note may be an electronic transcription/translation of spoken language to printed text using the Dragon Dictation System.

## 2024-10-30 ENCOUNTER — OFFICE VISIT (OUTPATIENT)
Dept: PSYCHIATRY | Facility: CLINIC | Age: 64
End: 2024-10-30
Payer: MEDICARE

## 2024-10-30 VITALS
HEIGHT: 73 IN | BODY MASS INDEX: 32.34 KG/M2 | DIASTOLIC BLOOD PRESSURE: 96 MMHG | HEART RATE: 82 BPM | OXYGEN SATURATION: 98 % | WEIGHT: 244 LBS | SYSTOLIC BLOOD PRESSURE: 158 MMHG

## 2024-10-30 DIAGNOSIS — F41.0 PANIC DISORDER: Chronic | ICD-10-CM

## 2024-10-30 DIAGNOSIS — F32.0 CURRENT MILD EPISODE OF MAJOR DEPRESSIVE DISORDER, UNSPECIFIED WHETHER RECURRENT: Chronic | ICD-10-CM

## 2024-10-30 DIAGNOSIS — F41.1 GENERALIZED ANXIETY DISORDER: Primary | Chronic | ICD-10-CM

## 2024-10-30 PROCEDURE — 1159F MED LIST DOCD IN RCRD: CPT | Performed by: NURSE PRACTITIONER

## 2024-10-30 PROCEDURE — 1160F RVW MEDS BY RX/DR IN RCRD: CPT | Performed by: NURSE PRACTITIONER

## 2024-10-30 PROCEDURE — 99214 OFFICE O/P EST MOD 30 MIN: CPT | Performed by: NURSE PRACTITIONER

## 2024-10-30 RX ORDER — ESCITALOPRAM OXALATE 10 MG/1
10 TABLET ORAL DAILY
Qty: 90 TABLET | Refills: 1 | Status: SHIPPED | OUTPATIENT
Start: 2024-10-30 | End: 2025-10-30

## 2025-04-23 DIAGNOSIS — F41.0 PANIC DISORDER: Chronic | ICD-10-CM

## 2025-04-23 RX ORDER — ALPRAZOLAM 0.5 MG
0.5 TABLET ORAL NIGHTLY PRN
Qty: 30 TABLET | Refills: 0 | Status: SHIPPED | OUTPATIENT
Start: 2025-04-23

## 2025-04-30 ENCOUNTER — OFFICE VISIT (OUTPATIENT)
Dept: PSYCHIATRY | Facility: CLINIC | Age: 65
End: 2025-04-30
Payer: MEDICARE

## 2025-04-30 VITALS
DIASTOLIC BLOOD PRESSURE: 86 MMHG | HEIGHT: 73 IN | BODY MASS INDEX: 32.74 KG/M2 | WEIGHT: 247 LBS | OXYGEN SATURATION: 96 % | SYSTOLIC BLOOD PRESSURE: 138 MMHG | HEART RATE: 82 BPM

## 2025-04-30 DIAGNOSIS — F41.1 GENERALIZED ANXIETY DISORDER: Primary | ICD-10-CM

## 2025-04-30 DIAGNOSIS — F32.0 CURRENT MILD EPISODE OF MAJOR DEPRESSIVE DISORDER, UNSPECIFIED WHETHER RECURRENT: Chronic | ICD-10-CM

## 2025-04-30 DIAGNOSIS — Z79.899 MEDICATION MANAGEMENT: ICD-10-CM

## 2025-04-30 RX ORDER — ESCITALOPRAM OXALATE 10 MG/1
10 TABLET ORAL DAILY
Qty: 90 TABLET | Refills: 1 | Status: SHIPPED | OUTPATIENT
Start: 2025-04-30 | End: 2025-10-27

## 2025-04-30 RX ORDER — AZITHROMYCIN 250 MG/1
TABLET, FILM COATED ORAL
COMMUNITY
Start: 2025-03-17 | End: 2025-04-30

## 2025-04-30 RX ORDER — METHYLPREDNISOLONE 4 MG/1
TABLET ORAL
COMMUNITY
Start: 2025-03-17 | End: 2025-04-30
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
## 2025-05-09 LAB — DRUGS UR: NORMAL
